# Patient Record
Sex: MALE | Race: WHITE | NOT HISPANIC OR LATINO | Employment: OTHER | URBAN - METROPOLITAN AREA
[De-identification: names, ages, dates, MRNs, and addresses within clinical notes are randomized per-mention and may not be internally consistent; named-entity substitution may affect disease eponyms.]

---

## 2017-03-15 ENCOUNTER — TELEPHONE (OUTPATIENT)
Dept: CARDIOLOGY | Facility: CLINIC | Age: 62
End: 2017-03-15

## 2017-03-15 DIAGNOSIS — E78.00 HYPERCHOLESTEROLEMIA: Primary | ICD-10-CM

## 2018-10-02 ENCOUNTER — OFFICE VISIT (OUTPATIENT)
Dept: UROLOGY | Facility: CLINIC | Age: 63
End: 2018-10-02
Payer: COMMERCIAL

## 2018-10-02 VITALS
BODY MASS INDEX: 28.75 KG/M2 | SYSTOLIC BLOOD PRESSURE: 133 MMHG | HEIGHT: 70 IN | DIASTOLIC BLOOD PRESSURE: 85 MMHG | HEART RATE: 76 BPM | WEIGHT: 200.81 LBS

## 2018-10-02 DIAGNOSIS — N52.9 ERECTILE DYSFUNCTION, UNSPECIFIED ERECTILE DYSFUNCTION TYPE: Primary | ICD-10-CM

## 2018-10-02 DIAGNOSIS — Z12.5 PROSTATE CANCER SCREENING: ICD-10-CM

## 2018-10-02 DIAGNOSIS — N13.8 BENIGN PROSTATIC HYPERPLASIA WITH URINARY OBSTRUCTION: ICD-10-CM

## 2018-10-02 DIAGNOSIS — N40.1 BENIGN PROSTATIC HYPERPLASIA WITH URINARY OBSTRUCTION: ICD-10-CM

## 2018-10-02 DIAGNOSIS — N39.0 CHRONIC UTI: ICD-10-CM

## 2018-10-02 PROCEDURE — 99214 OFFICE O/P EST MOD 30 MIN: CPT | Mod: S$GLB,,, | Performed by: UROLOGY

## 2018-10-02 PROCEDURE — 99999 PR PBB SHADOW E&M-EST. PATIENT-LVL III: CPT | Mod: PBBFAC,,, | Performed by: UROLOGY

## 2018-10-02 PROCEDURE — 3008F BODY MASS INDEX DOCD: CPT | Mod: CPTII,S$GLB,, | Performed by: UROLOGY

## 2018-10-02 NOTE — PROGRESS NOTES
Subjective:       Patient ID: Marky Patel is a 62 y.o. male.    Chief Complaint: Follow-up      Mr. Patel is a 62-year-old male with BPH with obstruction.   He was last seen 2 years ago.     Nocturia on occasion, rare. Maybe 1-2 times if it happens.     Slight worsening in erections. Occasional morning erections. Sexual desire decreased.   He tried cialis in past, but not in a while.   Decreased sexual desire.  He has 3 teas a day.  He also drinks water during the day.     He has a history of recurrent UTIs.  No history of kidney stones.  No gross hematuria.     He has nitroglycerin.    Lab Results       Component                Value               Date                       PSA                      0.73                09/12/2016                 PSA                      0.64                12/12/2012                 PSA                      0.78                04/07/2011                 PSADIAG                  0.70                09/10/2015                        Past Surgical History:   Procedure Laterality Date    CHOLECYSTECTOMY      orthoscopic knee left       VASECTOMY         Past Medical History:   Diagnosis Date    BPH (benign prostatic hyperplasia)     Sebaceous cyst     Urinary tract infection        Social History     Socioeconomic History    Marital status:      Spouse name: Not on file    Number of children: Not on file    Years of education: Not on file    Highest education level: Not on file   Social Needs    Financial resource strain: Not on file    Food insecurity - worry: Not on file    Food insecurity - inability: Not on file    Transportation needs - medical: Not on file    Transportation needs - non-medical: Not on file   Occupational History    Not on file   Tobacco Use    Smoking status: Never Smoker    Smokeless tobacco: Never Used   Substance and Sexual Activity    Alcohol use: Yes     Alcohol/week: 3.6 oz     Types: 6 Cans of beer per week    Drug use: No     Sexual activity: Not on file   Other Topics Concern    Not on file   Social History Narrative    Not on file       Family History   Problem Relation Age of Onset    Cancer Father         lymphoma    Hyperlipidemia Father     Cancer Mother         colon    Heart attack Neg Hx     Heart disease Neg Hx     Heart failure Neg Hx     Hypertension Neg Hx     Stroke Neg Hx        Current Outpatient Medications   Medication Sig Dispense Refill    acetaminophen-codeine 300-60mg (TYLENOL #4) 300-60 mg Tab Take by mouth once daily. Pt taking one to two pills once a day  3    hydrocodone-acetaminophen 7.5-325mg (NORCO) 7.5-325 mg per tablet Take 1 tablet by mouth every 6 (six) hours as needed. Pt taking PRN  0    ibuprofen (ADVIL,MOTRIN) 800 MG tablet TK 1 T PO  BID PC Pt taking prn  2    methocarbamol (ROBAXIN) 750 MG Tab TK 1 T PO  TID PRF SPASMS Pt taking prn  2    nitroGLYCERIN (NITROSTAT) 0.4 MG SL tablet Place 1 tablet (0.4 mg total) under the tongue every 5 (five) minutes as needed for Chest pain. 30 tablet 3     No current facility-administered medications for this visit.        Review of patient's allergies indicates:  No Known Allergies     BMP  Lab Results   Component Value Date     09/12/2016    K 4.8 09/12/2016     09/12/2016    CO2 28 09/12/2016    BUN 13 09/12/2016    CREATININE 1.0 09/12/2016    CALCIUM 9.6 09/12/2016    ANIONGAP 8 09/12/2016    ESTGFRAFRICA >60.0 09/12/2016    EGFRNONAA >60.0 09/12/2016       Review of Systems   Constitutional: Negative for chills, fever and unexpected weight change.   HENT: Negative for hearing loss and nosebleeds.    Eyes: Negative for visual disturbance.   Respiratory: Negative for chest tightness.    Cardiovascular: Negative for chest pain.   Gastrointestinal: Negative for diarrhea.   Genitourinary: Negative for frequency, nocturia and urgency.   Musculoskeletal: Negative for joint swelling.   Skin: Negative for rash.   Neurological: Negative  for seizures.   Hematological: Does not bruise/bleed easily.   Psychiatric/Behavioral: Negative for behavioral problems.     Objective:      Physical Exam   Constitutional: He is oriented to person, place, and time. He appears well-developed and well-nourished.   HENT:   Head: Normocephalic and atraumatic.   Eyes: No scleral icterus.   Neck: Neck supple.   Cardiovascular: Normal rate and regular rhythm.    Pulmonary/Chest: Effort normal. No respiratory distress.   Abdominal: He exhibits no mass. Hernia confirmed negative in the right inguinal area and confirmed negative in the left inguinal area.   Genitourinary: Testes normal and penis normal. Circumcised.   Genitourinary Comments: Prostate was smooth without nodularity. No rectal masses.  35 grams. External hemorrhoids were not present. Some perineal erythema.    Musculoskeletal: He exhibits no tenderness.   Lymphadenopathy:     He has no cervical adenopathy. No inguinal adenopathy noted on the right or left side.   Neurological: He is alert and oriented to person, place, and time.   Skin: Skin is warm. No rash noted.     Psychiatric: He has a normal mood and affect.       Assessment:       1. Erectile dysfunction, unspecified erectile dysfunction type    2. Chronic UTI    3. Benign prostatic hyperplasia with urinary obstruction        Plan:   Marky was seen today for follow-up.    Diagnoses and all orders for this visit:    Erectile dysfunction, unspecified erectile dysfunction type  -     Testosterone; Future    Chronic UTI    Benign prostatic hyperplasia with urinary obstruction    Prostate cancer screening  -     PSA, Screening; Future      psa and testosterone.   Discussed penile injections/JERILYN and urethral alprostadil. Patient would like to think about it.

## 2018-10-02 NOTE — PATIENT INSTRUCTIONS
Penile Self-Injection Procedure  Self-injection is a good option if you have erectile dysfunction (ED). You insert a tiny needle into your penis and inject a medicine. This helps your penis get hard and stay that way long enough for sex. And sex and orgasm will feel as good as always. You may be nervous about doing self-injection at first. But with practice, it will get easier. Your healthcare provider will show you how to do self-injection the first time.  Talk to your doctor about any medicines you take and any medical problems you have.      Preparing for injection  · Wash your hands well with soap and water.  · Prepare the medicine (if needed).  · Sit or  a comfortable position in a warm, well-lit room. If you need to, sit or  front of a mirror.  · Find an injection site on one side of your penis, in a place with no visible veins. (Dont inject into the top, bottom, or head of the penis.)  · Clean the injection site with an alcohol swab. Grasp the head of your penis firmly with your thumb and forefinger (dont just pinch the skin). Stretch the penis so the skin on the shaft is taut.  Injecting the medicine  · Rest your penis against your inner thigh and pull it gently toward your knee. Dont twist or rotate it. This way youll be sure to inject the medicine into the spot you chose and cleaned before.  · Hold the syringe between your thumb and fingers, like youre holding a pen. Rest your forearm on your thigh for support.  · Insert the needle at a 90° angle (perpendicular) to the shaft. Do this quickly to reduce discomfort. (The needle should go in easily. If it doesnt, stop right away.)  · Move your thumb to the plunger. Press down to inject the medicine, counting to 5.  · Remove the needle and dispose of it safely.  Gaining an erection  · Apply pressure to the injection site for a few minutes. This prevents swelling and bruising and helps spread the medicine.   · Stand up. This may help your  erection develop. Foreplay often helps, too.  · Your penis should become firm within 10 to 20 minutes. The erection will last long enough for sex, and maybe longer.  When to seek medical care  An erection that lasts longer than 3 to 4  hours  · Bleeding or bruising  · Severe pain  · Scarring or curvature of the penis   Date Last Reviewed: 1/7/2017  © 1422-7489 [employer]. [address] [place] [address], [place] [address]. All rights reserved. This information is not intended as a substitute for professional medical care. Always follow your healthcare professional's instructions.        Using Vacuum Erection Therapy  The accompanying steps show how to gain and maintain an erection with a vacuum erection therapy system.      Getting started  · Place the rubber tension ring on the open end of the cylinder.  · Apply water-based lubricant to the end of the cylinder.  · Put your penis into the cylinder. Hold the cylinder firmly against your abdomen to create a seal, but take care not to pinch the scrotum.  Gaining an erection  · Squeeze the hand pump or turn on the electric pump. Blood will be drawn into your penis and your penis will become erect and firm.  · Follow the instructions youve been given for using your particular brand of pump.  · It may take some practice before you get optimum erections.  Using the tension ring  · When your penis is fully erect (usually less than 5 minutes), roll the tension ring off the cylinder onto the base of your penis. The tension ring holds blood in your penis, creating an erection. The area behind the ring remains soft and flexible.  · Remove the cylinder from your penis.  · After no longer than 30 minutes, remove the tension ring from your penis by grasping the tabs on the ring and pulling to stretch the ring.  When to call your healthcare provider  · A very cold penis during erection (some coolness is normal)  · A black-and-blue or significantly darkened penis (some  discoloration is normal)  · Pain while using the vacuum device or tension ring  · Lack of an erection or loss of an erection before the tension ring is removed  Note: The tension ring may block ejaculation during orgasm. This is harmless, but will not prevent pregnancy.   Date Last Reviewed: 1/1/2017 © 2000-2017 [employer]. [address] [place] [address]. All rights reserved. This information is not intended as a substitute for professional medical care. Always follow your healthcare professional's instructions.        Alprostadil urethral suppository  What is this medicine?  ALPROSTADIL (al PROS ta dil) is a natural protein. It is used to treat erectile dysfunction (ED). This medicine helps to create and maintain an erection.  How should I use this medicine?  This medicine is for insertion into the penis. Follow the directions that come with your medicine. If you are unsure how to use this medicine, contact your doctor or health care professional. Do not change the dose of your medicine. Call your doctor or health care professional to determine if your dose needs to be changed.  A patient package insert for the product will be given with each prescription and refill. Read this sheet carefully each time. The sheet may change frequently.  This medicine is for use in men only and is not for use in children.  What side effects may I notice from receiving this medicine?  Side effects that you should report to your doctor or health care professional as soon as possible:  · allergic reactions like skin rash, itching or hives, swelling of the face, lips, or tongue)  · dizziness or light-headedness  · fainting  · fast, irregular heartbeat  · prolonged or painful erection (lasting longer than 4 hours)  · swelling of the legs  · trouble passing urine or change in the amount of urine  Side effects that usually do not require medical attention (report to your doctor or health care professional if they  continue or are bothersome):  · aching in the penis, testicles, legs, or in the perineum (area between the penis and rectum)  · minor discomfort on insertion  · redness of the penis due to increased blood flow  · small amount of bleeding or spotting from the urethra due to improper use  · warmth or burning sensation in the urethra  What may interact with this medicine?  · medicines for blood pressure  What if I miss a dose?  This medicine should only be used as needed to achieve an erection. Do not use this medicine more often than directed. Do not use more than 2 times in each 24-hour period. Each suppository is for a single use only and should be properly discarded after use.  Where should I keep my medicine?  Keep out of reach of children.  Store unopened foil pouches in a refrigerator between 2 and 8 degrees C (36 and 46 degrees F). You may store this medicine at room temperature for up to 14 days before use. Do not expose to temperatures above 30 degrees C (86 degrees F). Do not expose to direct sunlight. When traveling, store this medicine in a portable ice pack or cooler. Do not store in the trunk of a car or in baggage storage areas. Throw away any unused medicine after the expiration date.  The suppository must be thrown away after it is used. It is for single use only. Discard any used medicine and packaging carefully. This medicine may cause accidental overdose and death if it taken by other adults, children, or pets. Follow the directions you receive with each prescription refill.  What should I tell my health care provider before I take this medicine?  They need to know if you have, or have had any of these conditions:  · an abnormally formed penis  · bleeding disorders  · have a condition that might cause a painful, prolonged erection, such as sickle cell disease or trait, leukemia, or multiple myeloma  · have been advised not to engage in sexual activity  · history of blood clots  · history of  fainting  · history of low blood pressure  · penile implant  · trouble passing urine  · an unusual or allergic reaction to alprostadil or other medicines, foods, dyes, or preservatives  · if your partner is pregnant or intends to become pregnant  What should I watch for while using this medicine?  Visit your doctor or health care professional for regular checks on your condition.  If you experience dizziness or feel faint during sexual intercourse, this may be due to the lowering of your blood pressure. Lie down immediately and raise your legs. If symptoms persist, call your doctor promptly.  Contact your doctor or health care professional right away if you have an erection that lasts longer than 4 hours or if it becomes painful. This may be a sign of a serious problem and must be treated right away to prevent permanent damage.  Men should inform their doctors if they wish to father a child. This medicine may be found in human semen and may have the potential for side effects to an unborn child. A female partner should not become pregnant while you are using this medicine. It is also recommended that you do not use this medicine while your partner is pregnant. Inform your doctor if your female partner wishes to become pregnant or think they might be pregnant. Talk to your health care professional or pharmacist for more information. It is recommended that you use a condom during sexual intercourse with a female partner who is of child-bearing age. If you are not using a condom, your female partner may also experience mild vaginal itching or burning.  This medicine does not protect you or your partner against HIV infection (AIDS) or any other sexually transmitted diseases.  NOTE:This sheet is a summary. It may not cover all possible information. If you have questions about this medicine, talk to your doctor, pharmacist, or health care provider. Copyright© 2017 [employer]

## 2018-10-03 ENCOUNTER — LAB VISIT (OUTPATIENT)
Dept: LAB | Facility: HOSPITAL | Age: 63
End: 2018-10-03
Attending: UROLOGY
Payer: COMMERCIAL

## 2018-10-03 DIAGNOSIS — Z12.5 PROSTATE CANCER SCREENING: ICD-10-CM

## 2018-10-03 DIAGNOSIS — N52.9 ERECTILE DYSFUNCTION, UNSPECIFIED ERECTILE DYSFUNCTION TYPE: ICD-10-CM

## 2018-10-03 LAB
COMPLEXED PSA SERPL-MCNC: 0.61 NG/ML
TESTOST SERPL-MCNC: 419 NG/DL

## 2018-10-03 PROCEDURE — 36415 COLL VENOUS BLD VENIPUNCTURE: CPT | Mod: PO

## 2018-10-03 PROCEDURE — 84153 ASSAY OF PSA TOTAL: CPT

## 2018-10-03 PROCEDURE — 84403 ASSAY OF TOTAL TESTOSTERONE: CPT

## 2018-10-04 ENCOUNTER — PATIENT MESSAGE (OUTPATIENT)
Dept: UROLOGY | Facility: CLINIC | Age: 63
End: 2018-10-04

## 2019-11-13 ENCOUNTER — OFFICE VISIT (OUTPATIENT)
Dept: DERMATOLOGY | Facility: CLINIC | Age: 64
End: 2019-11-13
Payer: COMMERCIAL

## 2019-11-13 VITALS — BODY MASS INDEX: 28.63 KG/M2 | HEIGHT: 70 IN | WEIGHT: 200 LBS

## 2019-11-13 DIAGNOSIS — L57.0 ACTINIC KERATOSES: ICD-10-CM

## 2019-11-13 DIAGNOSIS — L71.9 ACNE ROSACEA: ICD-10-CM

## 2019-11-13 DIAGNOSIS — Z12.83 SKIN CANCER SCREENING: ICD-10-CM

## 2019-11-13 DIAGNOSIS — Z80.8 FAMILY HISTORY OF MELANOMA: Primary | ICD-10-CM

## 2019-11-13 DIAGNOSIS — L23.7 ALLERGIC CONTACT DERMATITIS DUE TO PLANTS, EXCEPT FOOD: ICD-10-CM

## 2019-11-13 PROCEDURE — 99999 PR PBB SHADOW E&M-EST. PATIENT-LVL III: CPT | Mod: PBBFAC,,, | Performed by: DERMATOLOGY

## 2019-11-13 PROCEDURE — 99999 PR PBB SHADOW E&M-EST. PATIENT-LVL III: ICD-10-PCS | Mod: PBBFAC,,, | Performed by: DERMATOLOGY

## 2019-11-13 PROCEDURE — 17000 DESTRUCT PREMALG LESION: CPT | Mod: S$GLB,,, | Performed by: DERMATOLOGY

## 2019-11-13 PROCEDURE — 17003 DESTRUCTION, PREMALIGNANT LESIONS; SECOND THROUGH 14 LESIONS: ICD-10-PCS | Mod: S$GLB,,, | Performed by: DERMATOLOGY

## 2019-11-13 PROCEDURE — 99203 OFFICE O/P NEW LOW 30 MIN: CPT | Mod: 25,S$GLB,, | Performed by: DERMATOLOGY

## 2019-11-13 PROCEDURE — 3008F BODY MASS INDEX DOCD: CPT | Mod: CPTII,S$GLB,, | Performed by: DERMATOLOGY

## 2019-11-13 PROCEDURE — 17003 DESTRUCT PREMALG LES 2-14: CPT | Mod: S$GLB,,, | Performed by: DERMATOLOGY

## 2019-11-13 PROCEDURE — 3008F PR BODY MASS INDEX (BMI) DOCUMENTED: ICD-10-PCS | Mod: CPTII,S$GLB,, | Performed by: DERMATOLOGY

## 2019-11-13 PROCEDURE — 99203 PR OFFICE/OUTPT VISIT, NEW, LEVL III, 30-44 MIN: ICD-10-PCS | Mod: 25,S$GLB,, | Performed by: DERMATOLOGY

## 2019-11-13 PROCEDURE — 17000 PR DESTRUCTION(LASER SURGERY,CRYOSURGERY,CHEMOSURGERY),PREMALIGNANT LESIONS,FIRST LESION: ICD-10-PCS | Mod: S$GLB,,, | Performed by: DERMATOLOGY

## 2019-11-13 RX ORDER — MOMETASONE FUROATE 1 MG/G
CREAM TOPICAL
Qty: 45 G | Refills: 6 | Status: SHIPPED | OUTPATIENT
Start: 2019-11-13 | End: 2022-12-02

## 2019-11-13 RX ORDER — SODIUM SULFACETAMIDE AND SULFUR 80; 40 MG/ML; MG/ML
SOLUTION TOPICAL
Qty: 472 ML | Refills: 3 | Status: SHIPPED | OUTPATIENT
Start: 2019-11-13 | End: 2022-12-02

## 2019-11-13 RX ORDER — IVERMECTIN 10 MG/G
CREAM TOPICAL
Qty: 45 G | Refills: 3 | Status: SHIPPED | OUTPATIENT
Start: 2019-11-13 | End: 2022-12-02

## 2019-11-13 NOTE — PROGRESS NOTES
Subjective:       Patient ID:  Marky Patel is a 63 y.o. male who presents for   Chief Complaint   Patient presents with    Dry Skin     New pt.   Here today c/o dryness on face, x 16 months, flakes, redness, and sometimes itchy, tx w/ otc moisturizers, no progress  Requests skin check today    No personal h/o of skin cancer  Brother recently dx with melanoma    recently doing yardwork, noticed redness, itching lower back and posterior leg. May have come in contact with poison ivy  Former      Review of Systems   Constitutional: Negative for fever, chills and fatigue.   Skin: Positive for daily sunscreen use and activity-related sunscreen use. Negative for wears hat.   Hematologic/Lymphatic: Bruises/bleeds easily.        Objective:    Physical Exam   Constitutional: He appears well-developed and well-nourished. No distress.   HENT:   Mouth/Throat: Lips normal.    Eyes: Lids are normal.  No conjunctival no injection.   Cardiovascular: There is no local extremity swelling and no dependent edema.     Neurological: He is alert and oriented to person, place, and time. He is not disoriented.   Psychiatric: He has a normal mood and affect.   Skin:   Areas Examined (abnormalities noted in diagram):   Head / Face Inspection Performed  Neck Inspection Performed  Chest / Axilla Inspection Performed  Abdomen Inspection Performed  Back Inspection Performed  RUE Inspected  LUE Inspection Performed  RLE Inspected  LLE Inspection Performed                   Diagram Legend     Erythematous scaling macule/papule c/w actinic keratosis       Vascular papule c/w angioma      Pigmented verrucoid papule/plaque c/w seborrheic keratosis      Yellow umbilicated papule c/w sebaceous hyperplasia      Irregularly shaped tan macule c/w lentigo     1-2 mm smooth white papules consistent with Milia      Movable subcutaneous cyst with punctum c/w epidermal inclusion cyst      Subcutaneous movable cyst c/w pilar cyst      Firm  pink to brown papule c/w dermatofibroma      Pedunculated fleshy papule(s) c/w skin tag(s)      Evenly pigmented macule c/w junctional nevus     Mildly variegated pigmented, slightly irregular-bordered macule c/w mildly atypical nevus      Flesh colored to evenly pigmented papule c/w intradermal nevus       Pink pearly papule/plaque c/w basal cell carcinoma      Erythematous hyperkeratotic cursted plaque c/w SCC      Surgical scar with no sign of skin cancer recurrence      Open and closed comedones      Inflammatory papules and pustules      Verrucoid papule consistent consistent with wart     Erythematous eczematous patches and plaques     Dystrophic onycholytic nail with subungual debris c/w onychomycosis     Umbilicated papule    Erythematous-base heme-crusted tan verrucoid plaque consistent with inflamed seborrheic keratosis     Erythematous Silvery Scaling Plaque c/w Psoriasis     See annotation      Assessment / Plan:        Family history of melanoma      Total body skin examination performed today including at least 12 points as noted in physical examination. No lesions suspicious for malignancy noted.      Skin cancer screening  Total body skin examination performed today including at least 12 points as noted in physical examination. No lesions suspicious for malignancy noted.  Actinic keratoses  ,Cryosurgery Procedure Note    Verbal consent from the patient is obtained and the patient is aware of the precancerous quality and need for treatment of these lesions. Liquid nitrogen cryosurgery is applied to the 3 actinic keratoses, as detailed in the physical exam, to produce a freeze injury. The patient is aware that blisters may form and is instructed on wound care with gentle cleansing and use of vaseline ointment to keep moist until healed. The patient is supplied a handout on cryosurgery and is instructed to call if lesions do not completely resolve. Discussed risk postinflammatory pigmentary changes.      Suspect AKs on face as well, recommend tx rosacea first    Acne rosacea  -     ivermectin (SOOLANTRA) 1 % Crea; AAA face qhs and wash off in morning  Dispense: 45 g; Refill: 3  -     sulfacetamide sodium-sulfur (SULFACLEANSE 8-4) 8-4 % Susp; Wash face once daily  Dispense: 472 mL; Refill: 3    Allergic contact dermatitis due to plants, except food  -     mometasone 0.1% (ELOCON) 0.1 % cream; aaa poison ivy and other contact dermatitis bid only prn  Dispense: 45 g; Refill: 6             Follow up in about 2 months (around 1/13/2020).

## 2020-08-12 ENCOUNTER — OFFICE VISIT (OUTPATIENT)
Dept: DERMATOLOGY | Facility: CLINIC | Age: 65
End: 2020-08-12
Payer: COMMERCIAL

## 2020-08-12 VITALS — HEIGHT: 70 IN | BODY MASS INDEX: 28.63 KG/M2 | WEIGHT: 200 LBS

## 2020-08-12 DIAGNOSIS — D48.5 NEOPLASM OF UNCERTAIN BEHAVIOR OF SKIN: Primary | ICD-10-CM

## 2020-08-12 DIAGNOSIS — B35.4 TINEA CORPORIS: ICD-10-CM

## 2020-08-12 PROCEDURE — 88305 TISSUE EXAM BY PATHOLOGIST: ICD-10-PCS | Mod: 26,,, | Performed by: DERMATOLOGY

## 2020-08-12 PROCEDURE — 3008F PR BODY MASS INDEX (BMI) DOCUMENTED: ICD-10-PCS | Mod: CPTII,S$GLB,, | Performed by: DERMATOLOGY

## 2020-08-12 PROCEDURE — 99999 PR PBB SHADOW E&M-EST. PATIENT-LVL III: ICD-10-PCS | Mod: PBBFAC,,, | Performed by: DERMATOLOGY

## 2020-08-12 PROCEDURE — 99213 OFFICE O/P EST LOW 20 MIN: CPT | Mod: 25,S$GLB,, | Performed by: DERMATOLOGY

## 2020-08-12 PROCEDURE — 99999 PR PBB SHADOW E&M-EST. PATIENT-LVL III: CPT | Mod: PBBFAC,,, | Performed by: DERMATOLOGY

## 2020-08-12 PROCEDURE — 11102 TANGNTL BX SKIN SINGLE LES: CPT | Mod: S$GLB,,, | Performed by: DERMATOLOGY

## 2020-08-12 PROCEDURE — 11103 PR TANGENTIAL BIOPSY, SKIN, EA ADDTL LESION: ICD-10-PCS | Mod: S$GLB,,, | Performed by: DERMATOLOGY

## 2020-08-12 PROCEDURE — 11103 TANGNTL BX SKIN EA SEP/ADDL: CPT | Mod: S$GLB,,, | Performed by: DERMATOLOGY

## 2020-08-12 PROCEDURE — 88305 TISSUE EXAM BY PATHOLOGIST: CPT | Mod: 26,,, | Performed by: DERMATOLOGY

## 2020-08-12 PROCEDURE — 99213 PR OFFICE/OUTPT VISIT, EST, LEVL III, 20-29 MIN: ICD-10-PCS | Mod: 25,S$GLB,, | Performed by: DERMATOLOGY

## 2020-08-12 PROCEDURE — 3008F BODY MASS INDEX DOCD: CPT | Mod: CPTII,S$GLB,, | Performed by: DERMATOLOGY

## 2020-08-12 PROCEDURE — 88305 TISSUE EXAM BY PATHOLOGIST: CPT | Mod: 59 | Performed by: DERMATOLOGY

## 2020-08-12 PROCEDURE — 11102 PR TANGENTIAL BIOPSY, SKIN, SINGLE LESION: ICD-10-PCS | Mod: S$GLB,,, | Performed by: DERMATOLOGY

## 2020-08-12 NOTE — PROGRESS NOTES
Subjective:       Patient ID:  Marky Patel is a 64 y.o. male who presents for   Chief Complaint   Patient presents with    Spot     LOV 11/2019 for AK and acne rosacea managing with soolantra sul wash    Pt here today for c/o spots, on right arm and left lower leg, x few months-yr, stays scaly and not heal, no tx    family H/o melanoma-brother        rash, buttocks, itchy. Scratches, worse at night  Denies history tinea    Review of Systems   Constitutional: Negative for fever, chills and fatigue.   Skin: Positive for daily sunscreen use and activity-related sunscreen use.        Objective:    Physical Exam   Constitutional: He appears well-developed and well-nourished. No distress.   HENT:   Mouth/Throat: Lips normal.    Eyes: Lids are normal.  No conjunctival no injection.   Cardiovascular: There is no local extremity swelling and no dependent edema.     Neurological: He is alert and oriented to person, place, and time. He is not disoriented.   Psychiatric: He has a normal mood and affect.   Skin:   Areas Examined (abnormalities noted in diagram):   Head / Face Inspection Performed  Neck Inspection Performed  Genitals / Buttocks / Groin Inspection Performed  RUE Inspected  LUE Inspection Performed  RLE Inspected  LLE Inspection Performed                   Diagram Legend     Erythematous scaling macule/papule c/w actinic keratosis       Vascular papule c/w angioma      Pigmented verrucoid papule/plaque c/w seborrheic keratosis      Yellow umbilicated papule c/w sebaceous hyperplasia      Irregularly shaped tan macule c/w lentigo     1-2 mm smooth white papules consistent with Milia      Movable subcutaneous cyst with punctum c/w epidermal inclusion cyst      Subcutaneous movable cyst c/w pilar cyst      Firm pink to brown papule c/w dermatofibroma      Pedunculated fleshy papule(s) c/w skin tag(s)      Evenly pigmented macule c/w junctional nevus     Mildly variegated pigmented, slightly  irregular-bordered macule c/w mildly atypical nevus      Flesh colored to evenly pigmented papule c/w intradermal nevus       Pink pearly papule/plaque c/w basal cell carcinoma      Erythematous hyperkeratotic cursted plaque c/w SCC      Surgical scar with no sign of skin cancer recurrence      Open and closed comedones      Inflammatory papules and pustules      Verrucoid papule consistent consistent with wart     Erythematous eczematous patches and plaques     Dystrophic onycholytic nail with subungual debris c/w onychomycosis     Umbilicated papule    Erythematous-base heme-crusted tan verrucoid plaque consistent with inflamed seborrheic keratosis     Erythematous Silvery Scaling Plaque c/w Psoriasis     See annotation              Assessment / Plan:      Pathology Orders:     Normal Orders This Visit    Specimen to Pathology, Dermatology     Questions:    Procedure Type: Dermatology and skin neoplasms    Number of Specimens: 2    ------------------------: -------------------------    Spec 1 Procedure: Biopsy    Spec 1 Clinical Impression: scc vs other check margins    Spec 1 Source: left leg    ------------------------: -------------------------    Spec 2 Procedure: Biopsy    Spec 2 Clinical Impression: scc vs ak vs other    Spec 2 Source: right arm    Clinical Information: 1. pink nodule 2. scaling pink plaque        Neoplasm of uncertain behavior of skin  -     Specimen to Pathology, Dermatology  Shave biopsy procedure note:    Shave biopsy performed after verbal consent including risk of infection, scar, recurrence, need for additional treatment of site. Area prepped with alcohol, anesthetized with approximately 1.0cc of 1% lidocaine with epinephrine. Lesional tissue shaved with razor blade. Hemostasis achieved with application of aluminum chloride followed by hyfrecation. No complications. Dressing applied. Wound care explained.        Tinea corporis    otc lamisil cream bid   Call if not improving          Follow up in about 6 months (around 2/12/2021).

## 2020-08-18 LAB
FINAL PATHOLOGIC DIAGNOSIS: NORMAL
GROSS: NORMAL
MICROSCOPIC EXAM: NORMAL

## 2020-11-27 ENCOUNTER — OFFICE VISIT (OUTPATIENT)
Dept: UROLOGY | Facility: CLINIC | Age: 65
End: 2020-11-27
Payer: COMMERCIAL

## 2020-11-27 VITALS — WEIGHT: 213.94 LBS | BODY MASS INDEX: 30.63 KG/M2 | HEIGHT: 70 IN

## 2020-11-27 DIAGNOSIS — N40.0 ENLARGED PROSTATE: ICD-10-CM

## 2020-11-27 DIAGNOSIS — N45.1 EPIDIDYMITIS: ICD-10-CM

## 2020-11-27 DIAGNOSIS — N50.82 SCROTAL PAIN: Primary | ICD-10-CM

## 2020-11-27 DIAGNOSIS — N41.0 ACUTE PROSTATITIS: ICD-10-CM

## 2020-11-27 PROCEDURE — 1125F PR PAIN SEVERITY QUANTIFIED, PAIN PRESENT: ICD-10-PCS | Mod: S$GLB,,, | Performed by: STUDENT IN AN ORGANIZED HEALTH CARE EDUCATION/TRAINING PROGRAM

## 2020-11-27 PROCEDURE — 99214 OFFICE O/P EST MOD 30 MIN: CPT | Mod: S$GLB,,, | Performed by: STUDENT IN AN ORGANIZED HEALTH CARE EDUCATION/TRAINING PROGRAM

## 2020-11-27 PROCEDURE — 87086 URINE CULTURE/COLONY COUNT: CPT

## 2020-11-27 PROCEDURE — 99999 PR PBB SHADOW E&M-EST. PATIENT-LVL III: ICD-10-PCS | Mod: PBBFAC,,, | Performed by: STUDENT IN AN ORGANIZED HEALTH CARE EDUCATION/TRAINING PROGRAM

## 2020-11-27 PROCEDURE — 3008F PR BODY MASS INDEX (BMI) DOCUMENTED: ICD-10-PCS | Mod: CPTII,S$GLB,, | Performed by: STUDENT IN AN ORGANIZED HEALTH CARE EDUCATION/TRAINING PROGRAM

## 2020-11-27 PROCEDURE — 1125F AMNT PAIN NOTED PAIN PRSNT: CPT | Mod: S$GLB,,, | Performed by: STUDENT IN AN ORGANIZED HEALTH CARE EDUCATION/TRAINING PROGRAM

## 2020-11-27 PROCEDURE — 99214 PR OFFICE/OUTPT VISIT, EST, LEVL IV, 30-39 MIN: ICD-10-PCS | Mod: S$GLB,,, | Performed by: STUDENT IN AN ORGANIZED HEALTH CARE EDUCATION/TRAINING PROGRAM

## 2020-11-27 PROCEDURE — 3008F BODY MASS INDEX DOCD: CPT | Mod: CPTII,S$GLB,, | Performed by: STUDENT IN AN ORGANIZED HEALTH CARE EDUCATION/TRAINING PROGRAM

## 2020-11-27 PROCEDURE — 99999 PR PBB SHADOW E&M-EST. PATIENT-LVL III: CPT | Mod: PBBFAC,,, | Performed by: STUDENT IN AN ORGANIZED HEALTH CARE EDUCATION/TRAINING PROGRAM

## 2020-11-27 RX ORDER — DOXYCYCLINE HYCLATE 100 MG
100 TABLET ORAL EVERY 12 HOURS
Qty: 28 TABLET | Refills: 0 | Status: SHIPPED | OUTPATIENT
Start: 2020-11-27 | End: 2020-12-11

## 2020-11-27 NOTE — PROGRESS NOTES
Patient ID: Marky Patel is a 64 y.o. male.    Chief Complaint: Urinary Tract Infection (new pt coming in for possible UTI, pt has been experiencing abd pain ) and Prostate Check (pt would like prostate check )    Referral: Self    HPI   Last evaluated in urologist office 10/2/18, Dr. Ramos for BPH, ED. Previously had hx of rec UTI.     Patient w/ 3 week hx of scrotal pain associated with L sided swelling. Patient reports slight pelvic discomfort while driving. Patient denies dysuria, gross hematuria. Patient denies flank pain. He is not aware of aggravating or alleviating factors.  Patient sexually active without assistance. Patient reports nocturia 3/4 times per evening, he has noted increased consumption of beer at night. Patient has hx of LORI does not use CPAP as previously prescribed.  Patient reports that its more likely he is awake then urinates rather than he is awakened to void. Patient does not drink caffeine, soda. He has been drinking unsweetened cranberry juice to help with his symptoms. He drinks about 3 bottles of water daily. He has not had a UTI in about 3 years.     Patient reports dry ejaculations, he is not taking flomax, he was previously prescribed but did not perceive any benefit.     Hx of vasectomy    Last PSA obtained 10/3/18- 0.61        ROS  Review of Systems   Constitutional: Negative for activity change, appetite change, chills, diaphoresis, fatigue and fever.   HENT: Negative for congestion, rhinorrhea and sore throat.    Eyes: Negative for discharge and visual disturbance.   Respiratory: Negative for cough, chest tightness, shortness of breath and wheezing.    Cardiovascular: Negative for chest pain and leg swelling.   Gastrointestinal: Negative for abdominal distention, abdominal pain, blood in stool, constipation, diarrhea, nausea and vomiting.   Genitourinary: Positive for frequency and scrotal swelling. Negative for discharge, dysuria, hematuria, penile pain and penile  swelling.   Musculoskeletal: Negative for back pain and gait problem.   Skin: Negative for color change, rash and wound.   Allergic/Immunologic: Negative for immunocompromised state.   Neurological: Negative for light-headedness and headaches.   Psychiatric/Behavioral: Negative for confusion. The patient is not nervous/anxious.          Past Medical History  Active Ambulatory Problems     Diagnosis Date Noted    Benign prostatic hyperplasia with urinary obstruction 12/11/2012    Hypercholesterolemia 09/08/2015    Erectile dysfunction 09/08/2015    Chronic UTI 08/25/2016    Chest pain on exertion 08/31/2016    Shortness of breath 08/31/2016    LORI (obstructive sleep apnea) 08/31/2016     Resolved Ambulatory Problems     Diagnosis Date Noted    No Resolved Ambulatory Problems     Past Medical History:   Diagnosis Date    BPH (benign prostatic hyperplasia)     Sebaceous cyst     Urinary tract infection          Past Surgical History  Past Surgical History:   Procedure Laterality Date    CHOLECYSTECTOMY      orthoscopic knee left       VASECTOMY         Social History  Relationships   Social connections    Talks on phone: Not on file    Gets together: Not on file    Attends [employer] service: Not on file    Active member of club or organization: Not on file    Attends meetings of clubs or organizations: Not on file    Relationship status: Not on file       Medications    Current Outpatient Medications:     ivermectin (SOOLANTRA) 1 % Crea, AAA face qhs and wash off in morning, Disp: 45 g, Rfl: 3    mometasone 0.1% (ELOCON) 0.1 % cream, aaa poison ivy and other contact dermatitis bid only prn (Patient not taking: Reported on 8/12/2020), Disp: 45 g, Rfl: 6    sulfacetamide sodium-sulfur (SULFACLEANSE 8-4) 8-4 % Susp, Wash face once daily, Disp: 472 mL, Rfl: 3    Allergies  Review of patient's allergies indicates:  No Known Allergies    Patient's PMH, FH, Social hx, Medications, allergies reviewed and  updated as pertinent to today's visit    Objective:      Physical Exam  Vitals signs reviewed.   Constitutional:       General: He is not in acute distress.     Appearance: He is well-developed. He is not ill-appearing, toxic-appearing or diaphoretic.   HENT:      Head: Normocephalic and atraumatic.      Nose: No congestion or rhinorrhea.      Mouth/Throat:      Mouth: Mucous membranes are moist.   Eyes:      Conjunctiva/sclera: Conjunctivae normal.   Neck:      Musculoskeletal: Normal range of motion and neck supple.   Cardiovascular:      Rate and Rhythm: Normal rate.   Pulmonary:      Effort: Pulmonary effort is normal. No respiratory distress.   Abdominal:      General: Abdomen is flat. There is no distension.      Palpations: Abdomen is soft. There is no mass.      Tenderness: There is no abdominal tenderness. There is no guarding.   Genitourinary:     Penis: Circumcised.       Scrotum/Testes:         Right: Mass, tenderness or swelling not present.         Left: Tenderness and swelling present. Mass not present.      Prostate: Tender. Not enlarged.      Rectum: No mass, tenderness or external hemorrhoid.   Musculoskeletal:         General: No swelling or deformity.   Skin:     General: Skin is warm and dry.      Capillary Refill: Capillary refill takes less than 2 seconds.      Findings: No rash.   Neurological:      Mental Status: He is alert and oriented to person, place, and time.      Gait: Gait normal.   Psychiatric:         Mood and Affect: Mood normal.         Thought Content: Thought content normal.         Judgment: Judgment normal.           PSA trend   PSA                      0.73          09/12/2016      PSA                      0.64          12/12/2012         PSA                      0.78          04/07/2011      PSADIAG              0.70         09/10/2015             Assessment:       1. Scrotal pain    2. Epididymitis    3. Enlarged prostate    4. Acute prostatitis        Plan:            Scrotal US to eval for epididymitis  Urine culture  UA  10 d of antibiotics for prostatitis/epididymitis   Discussed increased beer and LORI can contribute to increased urinary frequency at night. Minimizing beer intake and complying with CPAP may be helpful in reducing his symptoms.     PSA in two months  RTC 2 months

## 2020-11-29 LAB — BACTERIA UR CULT: NO GROWTH

## 2020-12-07 ENCOUNTER — HOSPITAL ENCOUNTER (OUTPATIENT)
Dept: RADIOLOGY | Facility: HOSPITAL | Age: 65
Discharge: HOME | End: 2020-12-07
Attending: STUDENT IN AN ORGANIZED HEALTH CARE EDUCATION/TRAINING PROGRAM
Payer: COMMERCIAL

## 2020-12-07 DIAGNOSIS — N50.82 SCROTAL PAIN: ICD-10-CM

## 2020-12-07 PROCEDURE — 76870 US EXAM SCROTUM: CPT | Mod: 26,,, | Performed by: RADIOLOGY

## 2020-12-07 PROCEDURE — 76870 US EXAM SCROTUM: CPT | Mod: TC

## 2020-12-07 PROCEDURE — 76870 US SCROTUM AND TESTICLES: ICD-10-PCS | Mod: 26,,, | Performed by: RADIOLOGY

## 2020-12-29 ENCOUNTER — OFFICE VISIT (OUTPATIENT)
Dept: FAMILY MEDICINE | Facility: CLINIC | Age: 65
End: 2020-12-29
Payer: COMMERCIAL

## 2020-12-29 VITALS
SYSTOLIC BLOOD PRESSURE: 124 MMHG | HEIGHT: 70 IN | OXYGEN SATURATION: 98 % | BODY MASS INDEX: 30.99 KG/M2 | WEIGHT: 216.5 LBS | TEMPERATURE: 98 F | HEART RATE: 72 BPM | DIASTOLIC BLOOD PRESSURE: 76 MMHG

## 2020-12-29 DIAGNOSIS — R53.83 FATIGUE, UNSPECIFIED TYPE: ICD-10-CM

## 2020-12-29 DIAGNOSIS — Z00.00 HEALTHCARE MAINTENANCE: ICD-10-CM

## 2020-12-29 DIAGNOSIS — R10.32 LEFT LOWER QUADRANT ABDOMINAL PAIN: ICD-10-CM

## 2020-12-29 DIAGNOSIS — R10.32 LEFT INGUINAL PAIN: ICD-10-CM

## 2020-12-29 DIAGNOSIS — R59.0 LAD (LYMPHADENOPATHY) OF RIGHT CERVICAL REGION: Primary | ICD-10-CM

## 2020-12-29 PROCEDURE — 1126F AMNT PAIN NOTED NONE PRSNT: CPT | Mod: S$GLB,,, | Performed by: PHYSICIAN ASSISTANT

## 2020-12-29 PROCEDURE — 99999 PR PBB SHADOW E&M-EST. PATIENT-LVL IV: CPT | Mod: PBBFAC,,, | Performed by: PHYSICIAN ASSISTANT

## 2020-12-29 PROCEDURE — 99204 PR OFFICE/OUTPT VISIT, NEW, LEVL IV, 45-59 MIN: ICD-10-PCS | Mod: S$GLB,,, | Performed by: PHYSICIAN ASSISTANT

## 2020-12-29 PROCEDURE — 3008F PR BODY MASS INDEX (BMI) DOCUMENTED: ICD-10-PCS | Mod: CPTII,S$GLB,, | Performed by: PHYSICIAN ASSISTANT

## 2020-12-29 PROCEDURE — 99999 PR PBB SHADOW E&M-EST. PATIENT-LVL IV: ICD-10-PCS | Mod: PBBFAC,,, | Performed by: PHYSICIAN ASSISTANT

## 2020-12-29 PROCEDURE — 1126F PR PAIN SEVERITY QUANTIFIED, NO PAIN PRESENT: ICD-10-PCS | Mod: S$GLB,,, | Performed by: PHYSICIAN ASSISTANT

## 2020-12-29 PROCEDURE — 99204 OFFICE O/P NEW MOD 45 MIN: CPT | Mod: S$GLB,,, | Performed by: PHYSICIAN ASSISTANT

## 2020-12-29 PROCEDURE — 3008F BODY MASS INDEX DOCD: CPT | Mod: CPTII,S$GLB,, | Performed by: PHYSICIAN ASSISTANT

## 2020-12-31 ENCOUNTER — LAB VISIT (OUTPATIENT)
Dept: LAB | Facility: HOSPITAL | Age: 65
End: 2020-12-31
Attending: PHYSICIAN ASSISTANT
Payer: COMMERCIAL

## 2020-12-31 ENCOUNTER — TELEPHONE (OUTPATIENT)
Dept: FAMILY MEDICINE | Facility: CLINIC | Age: 65
End: 2020-12-31

## 2020-12-31 DIAGNOSIS — Z00.00 HEALTHCARE MAINTENANCE: ICD-10-CM

## 2020-12-31 DIAGNOSIS — R59.0 LAD (LYMPHADENOPATHY) OF RIGHT CERVICAL REGION: ICD-10-CM

## 2020-12-31 DIAGNOSIS — R53.83 FATIGUE, UNSPECIFIED TYPE: ICD-10-CM

## 2020-12-31 LAB
ALBUMIN SERPL BCP-MCNC: 4.1 G/DL (ref 3.5–5.2)
ALP SERPL-CCNC: 76 U/L (ref 55–135)
ALT SERPL W/O P-5'-P-CCNC: 30 U/L (ref 10–44)
ANION GAP SERPL CALC-SCNC: 8 MMOL/L (ref 8–16)
AST SERPL-CCNC: 26 U/L (ref 10–40)
BASOPHILS # BLD AUTO: 0.03 K/UL (ref 0–0.2)
BASOPHILS NFR BLD: 0.6 % (ref 0–1.9)
BILIRUB SERPL-MCNC: 0.7 MG/DL (ref 0.1–1)
BUN SERPL-MCNC: 16 MG/DL (ref 8–23)
CALCIUM SERPL-MCNC: 9.1 MG/DL (ref 8.7–10.5)
CHLORIDE SERPL-SCNC: 105 MMOL/L (ref 95–110)
CHOLEST SERPL-MCNC: 255 MG/DL (ref 120–199)
CHOLEST/HDLC SERPL: 5.4 {RATIO} (ref 2–5)
CO2 SERPL-SCNC: 26 MMOL/L (ref 23–29)
COMPLEXED PSA SERPL-MCNC: 0.63 NG/ML (ref 0–4)
CREAT SERPL-MCNC: 1 MG/DL (ref 0.5–1.4)
DIFFERENTIAL METHOD: ABNORMAL
EOSINOPHIL # BLD AUTO: 0.1 K/UL (ref 0–0.5)
EOSINOPHIL NFR BLD: 1.7 % (ref 0–8)
ERYTHROCYTE [DISTWIDTH] IN BLOOD BY AUTOMATED COUNT: 12.3 % (ref 11.5–14.5)
EST. GFR  (AFRICAN AMERICAN): >60 ML/MIN/1.73 M^2
EST. GFR  (NON AFRICAN AMERICAN): >60 ML/MIN/1.73 M^2
GLUCOSE SERPL-MCNC: 114 MG/DL (ref 70–110)
HCT VFR BLD AUTO: 43.7 % (ref 40–54)
HCV AB SERPL QL IA: NEGATIVE
HDLC SERPL-MCNC: 47 MG/DL (ref 40–75)
HDLC SERPL: 18.4 % (ref 20–50)
HGB BLD-MCNC: 14.9 G/DL (ref 14–18)
HIV 1+2 AB+HIV1 P24 AG SERPL QL IA: NEGATIVE
IMM GRANULOCYTES # BLD AUTO: 0.02 K/UL (ref 0–0.04)
IMM GRANULOCYTES NFR BLD AUTO: 0.4 % (ref 0–0.5)
LDLC SERPL CALC-MCNC: 170.6 MG/DL (ref 63–159)
LYMPHOCYTES # BLD AUTO: 1.7 K/UL (ref 1–4.8)
LYMPHOCYTES NFR BLD: 33.7 % (ref 18–48)
MCH RBC QN AUTO: 31.2 PG (ref 27–31)
MCHC RBC AUTO-ENTMCNC: 34.1 G/DL (ref 32–36)
MCV RBC AUTO: 92 FL (ref 82–98)
MONOCYTES # BLD AUTO: 0.5 K/UL (ref 0.3–1)
MONOCYTES NFR BLD: 10.4 % (ref 4–15)
NEUTROPHILS # BLD AUTO: 2.8 K/UL (ref 1.8–7.7)
NEUTROPHILS NFR BLD: 53.2 % (ref 38–73)
NONHDLC SERPL-MCNC: 208 MG/DL
NRBC BLD-RTO: 0 /100 WBC
PLATELET # BLD AUTO: 203 K/UL (ref 150–350)
PMV BLD AUTO: 9.9 FL (ref 9.2–12.9)
POTASSIUM SERPL-SCNC: 4.6 MMOL/L (ref 3.5–5.1)
PROT SERPL-MCNC: 7 G/DL (ref 6–8.4)
RBC # BLD AUTO: 4.77 M/UL (ref 4.6–6.2)
SODIUM SERPL-SCNC: 139 MMOL/L (ref 136–145)
T4 FREE SERPL-MCNC: 0.97 NG/DL (ref 0.71–1.51)
TESTOST SERPL-MCNC: 465 NG/DL (ref 304–1227)
TRIGL SERPL-MCNC: 187 MG/DL (ref 30–150)
TSH SERPL DL<=0.005 MIU/L-ACNC: 1.52 UIU/ML (ref 0.4–4)
WBC # BLD AUTO: 5.17 K/UL (ref 3.9–12.7)

## 2020-12-31 PROCEDURE — 86703 HIV-1/HIV-2 1 RESULT ANTBDY: CPT

## 2020-12-31 PROCEDURE — 84153 ASSAY OF PSA TOTAL: CPT

## 2020-12-31 PROCEDURE — 80061 LIPID PANEL: CPT

## 2020-12-31 PROCEDURE — 84443 ASSAY THYROID STIM HORMONE: CPT

## 2020-12-31 PROCEDURE — 85025 COMPLETE CBC W/AUTO DIFF WBC: CPT

## 2020-12-31 PROCEDURE — 86803 HEPATITIS C AB TEST: CPT

## 2020-12-31 PROCEDURE — 36415 COLL VENOUS BLD VENIPUNCTURE: CPT | Mod: PO

## 2020-12-31 PROCEDURE — 84439 ASSAY OF FREE THYROXINE: CPT

## 2020-12-31 PROCEDURE — 84403 ASSAY OF TOTAL TESTOSTERONE: CPT

## 2020-12-31 PROCEDURE — 80053 COMPREHEN METABOLIC PANEL: CPT

## 2021-01-04 ENCOUNTER — HOSPITAL ENCOUNTER (OUTPATIENT)
Dept: RADIOLOGY | Facility: HOSPITAL | Age: 66
Discharge: HOME | End: 2021-01-04
Attending: PHYSICIAN ASSISTANT
Payer: COMMERCIAL

## 2021-01-04 DIAGNOSIS — R59.0 LAD (LYMPHADENOPATHY) OF RIGHT CERVICAL REGION: ICD-10-CM

## 2021-01-04 DIAGNOSIS — R10.32 LEFT INGUINAL PAIN: ICD-10-CM

## 2021-01-04 DIAGNOSIS — R10.32 LEFT LOWER QUADRANT ABDOMINAL PAIN: ICD-10-CM

## 2021-01-04 PROCEDURE — 25500020 PHARM REV CODE 255

## 2021-01-04 PROCEDURE — 74176 CT ABD & PELVIS W/O CONTRAST: CPT | Mod: 26,,, | Performed by: RADIOLOGY

## 2021-01-04 PROCEDURE — 76536 US EXAM OF HEAD AND NECK: CPT | Mod: TC

## 2021-01-04 PROCEDURE — 74176 CT ABDOMEN PELVIS WITHOUT CONTRAST: ICD-10-PCS | Mod: 26,,, | Performed by: RADIOLOGY

## 2021-01-04 PROCEDURE — 76536 US SOFT TISSUE HEAD NECK THYROID: ICD-10-PCS | Mod: 26,,, | Performed by: RADIOLOGY

## 2021-01-04 PROCEDURE — 74176 CT ABD & PELVIS W/O CONTRAST: CPT | Mod: TC

## 2021-01-04 PROCEDURE — A9698 NON-RAD CONTRAST MATERIALNOC: HCPCS

## 2021-01-04 PROCEDURE — 76536 US EXAM OF HEAD AND NECK: CPT | Mod: 26,,, | Performed by: RADIOLOGY

## 2021-01-04 RX ADMIN — IOHEXOL 1000 ML: 12 SOLUTION ORAL at 10:01

## 2021-04-29 ENCOUNTER — PATIENT MESSAGE (OUTPATIENT)
Dept: RESEARCH | Facility: HOSPITAL | Age: 66
End: 2021-04-29

## 2022-07-29 ENCOUNTER — PATIENT MESSAGE (OUTPATIENT)
Dept: UROLOGY | Facility: CLINIC | Age: 67
End: 2022-07-29
Payer: COMMERCIAL

## 2022-08-08 ENCOUNTER — TELEPHONE (OUTPATIENT)
Dept: UROLOGY | Facility: CLINIC | Age: 67
End: 2022-08-08
Payer: COMMERCIAL

## 2022-08-08 NOTE — TELEPHONE ENCOUNTER
I spoke with patient who decided to see Letha Posadas NP instead of waiting to see  due to book out.

## 2022-08-15 ENCOUNTER — OFFICE VISIT (OUTPATIENT)
Dept: UROLOGY | Facility: CLINIC | Age: 67
End: 2022-08-15
Payer: MEDICARE

## 2022-08-15 ENCOUNTER — LAB VISIT (OUTPATIENT)
Dept: LAB | Facility: HOSPITAL | Age: 67
End: 2022-08-15
Payer: MEDICARE

## 2022-08-15 VITALS
BODY MASS INDEX: 29.89 KG/M2 | DIASTOLIC BLOOD PRESSURE: 81 MMHG | SYSTOLIC BLOOD PRESSURE: 135 MMHG | HEIGHT: 70 IN | WEIGHT: 208.75 LBS | HEART RATE: 68 BPM

## 2022-08-15 DIAGNOSIS — N40.0 ENLARGED PROSTATE: Primary | ICD-10-CM

## 2022-08-15 DIAGNOSIS — R33.9 INCOMPLETE BLADDER EMPTYING: ICD-10-CM

## 2022-08-15 DIAGNOSIS — N40.0 ENLARGED PROSTATE: ICD-10-CM

## 2022-08-15 LAB
BILIRUB SERPL-MCNC: NORMAL MG/DL
BLOOD URINE, POC: NORMAL
CLARITY, POC UA: CLEAR
COLOR, POC UA: YELLOW
COMPLEXED PSA SERPL-MCNC: 0.82 NG/ML (ref 0–4)
GLUCOSE UR QL STRIP: NORMAL
KETONES UR QL STRIP: NORMAL
LEUKOCYTE ESTERASE URINE, POC: NORMAL
NITRITE, POC UA: NORMAL
PH, POC UA: 8
POC RESIDUAL URINE VOLUME: 137 ML (ref 0–100)
PROTEIN, POC: NORMAL
SPECIFIC GRAVITY, POC UA: 1
UROBILINOGEN, POC UA: NORMAL

## 2022-08-15 PROCEDURE — 36415 COLL VENOUS BLD VENIPUNCTURE: CPT | Performed by: NURSE PRACTITIONER

## 2022-08-15 PROCEDURE — 81002 URINALYSIS NONAUTO W/O SCOPE: CPT | Mod: PBBFAC | Performed by: NURSE PRACTITIONER

## 2022-08-15 PROCEDURE — 51798 US URINE CAPACITY MEASURE: CPT | Mod: PBBFAC | Performed by: NURSE PRACTITIONER

## 2022-08-15 PROCEDURE — 84153 ASSAY OF PSA TOTAL: CPT | Performed by: NURSE PRACTITIONER

## 2022-08-15 PROCEDURE — 99999 PR PBB SHADOW E&M-EST. PATIENT-LVL III: CPT | Mod: PBBFAC,,, | Performed by: NURSE PRACTITIONER

## 2022-08-15 PROCEDURE — 99213 OFFICE O/P EST LOW 20 MIN: CPT | Mod: PBBFAC | Performed by: NURSE PRACTITIONER

## 2022-08-15 PROCEDURE — 99214 OFFICE O/P EST MOD 30 MIN: CPT | Mod: S$PBB,,, | Performed by: NURSE PRACTITIONER

## 2022-08-15 PROCEDURE — 99214 PR OFFICE/OUTPT VISIT, EST, LEVL IV, 30-39 MIN: ICD-10-PCS | Mod: S$PBB,,, | Performed by: NURSE PRACTITIONER

## 2022-08-15 PROCEDURE — 99999 PR PBB SHADOW E&M-EST. PATIENT-LVL III: ICD-10-PCS | Mod: PBBFAC,,, | Performed by: NURSE PRACTITIONER

## 2022-08-15 NOTE — PROGRESS NOTES
CHIEF COMPLAINT:    Mr. Patel is a 66 y.o. male presenting for   Chief Complaint   Patient presents with    Prostate Check       PRESENTING ILLNESS:    Marky Patel is a 66 y.o. male with a PMH of hypercholesterolemia, bph, ED, LORI who presents for annual visit.    He reports a good urinary stream and does not completely empty bladder.  Reports having urodynamics several years ago that confirmed not emptying bladder.  Nocturia on occasion but not all the time.     No family history of prostate cancer. Last PSA reviewed 0.63 12/31/2020.     Has history of erectile dysfunction.  Not bothersome to him at this time.      REVIEW OF SYSTEMS:    Review of Systems   Constitutional: Negative for chills and fever.   Respiratory: Negative for shortness of breath.    Cardiovascular: Negative for chest pain.   Gastrointestinal: Negative for constipation and diarrhea.   Genitourinary: Negative for dysuria, flank pain, frequency, hematuria and urgency.   Neurological: Negative for dizziness and weakness.       PATIENT HISTORY:    Past Medical History:   Diagnosis Date    BPH (benign prostatic hyperplasia)     Sebaceous cyst     Urinary tract infection        Family History   Problem Relation Age of Onset    Cancer Father         lymphoma diagnosed at 81 years    Hyperlipidemia Father     Cancer Mother         colon 49 years old    Melanoma Brother     Colon cancer Sister     Heart attack Neg Hx     Heart disease Neg Hx     Heart failure Neg Hx     Hypertension Neg Hx     Stroke Neg Hx     Psoriasis Neg Hx     Lupus Neg Hx     Eczema Neg Hx        Allergies:  Patient has no known allergies.    Medications:    Current Outpatient Medications:     ivermectin (SOOLANTRA) 1 % Crea, AAA face qhs and wash off in morning, Disp: 45 g, Rfl: 3    mometasone 0.1% (ELOCON) 0.1 % cream, aaa poison ivy and other contact dermatitis bid only prn (Patient not taking: Reported on 8/12/2020), Disp: 45 g, Rfl: 6     sulfacetamide sodium-sulfur (SULFACLEANSE 8-4) 8-4 % Susp, Wash face once daily, Disp: 472 mL, Rfl: 3    PHYSICAL EXAMINATION:      Physical Exam  Vitals and nursing note reviewed.   Constitutional:       Appearance: Normal appearance. He is well-developed.   HENT:      Head: Normocephalic and atraumatic.   Eyes:      Pupils: Pupils are equal, round, and reactive to light.   Pulmonary:      Effort: Pulmonary effort is normal.   Abdominal:      Hernia: There is no hernia in the right inguinal area or left inguinal area.   Genitourinary:     Penis: Normal.       Testes: Normal.      Prostate: Enlarged. Not tender.      Rectum: Normal.      Comments: No nodules felt, prostate smooth  Musculoskeletal:         General: Normal range of motion.      Cervical back: Normal range of motion.   Skin:     General: Skin is warm and dry.   Neurological:      Mental Status: He is alert and oriented to person, place, and time.   Psychiatric:         Behavior: Behavior normal.           LABS:    U/a performed in office today: yellow, ph 8, 1.000, trace protein, otherwise normal  Bladder scan performed by Nurse Lindsey.   ml    Lab Results   Component Value Date    PSA 0.63 12/31/2020    PSA 0.61 10/03/2018    PSA 0.73 09/12/2016    PSA 0.64 12/12/2012    PSA 0.78 04/07/2011    PSADIAG 0.70 09/10/2015       IMPRESSION:  Encounter Diagnoses   Name Primary?    Enlarged prostate Yes    Incomplete bladder emptying          PLAN:  Problem List Items Addressed This Visit    None     Visit Diagnoses     Enlarged prostate    -  Primary    Relevant Orders    POCT URINE DIPSTICK WITHOUT MICROSCOPE (Completed)    POCT Bladder Scan (Completed)    PROSTATE SPECIFIC ANTIGEN, DIAGNOSTIC    Incomplete bladder emptying              1. Enlarge prostate   - DARIN complete   - obtain PSA  2. Incomplete bladder emptying   - chronic   - has tried flomax in the past, not interested in medication today    3. RTC in 1 yr or sooner prn    I spent 30  minutes with the patient. Over 50% of the visit was spent in counseling.    Letha Posadas NP

## 2022-08-31 ENCOUNTER — OFFICE VISIT (OUTPATIENT)
Dept: PHYSICAL MEDICINE AND REHAB | Facility: CLINIC | Age: 67
End: 2022-08-31
Payer: MEDICARE

## 2022-08-31 ENCOUNTER — HOSPITAL ENCOUNTER (OUTPATIENT)
Dept: RADIOLOGY | Facility: HOSPITAL | Age: 67
Discharge: HOME | End: 2022-08-31
Attending: PHYSICAL MEDICINE & REHABILITATION
Payer: MEDICARE

## 2022-08-31 VITALS
SYSTOLIC BLOOD PRESSURE: 130 MMHG | WEIGHT: 208.75 LBS | HEIGHT: 70 IN | BODY MASS INDEX: 29.89 KG/M2 | HEART RATE: 52 BPM | DIASTOLIC BLOOD PRESSURE: 82 MMHG

## 2022-08-31 DIAGNOSIS — M25.559 HIP PAIN: ICD-10-CM

## 2022-08-31 DIAGNOSIS — M25.559 PAIN IN UNSPECIFIED HIP: Primary | ICD-10-CM

## 2022-08-31 PROCEDURE — 99999 PR PBB SHADOW E&M-EST. PATIENT-LVL III: CPT | Mod: PBBFAC,,, | Performed by: PHYSICAL MEDICINE & REHABILITATION

## 2022-08-31 PROCEDURE — 99213 OFFICE O/P EST LOW 20 MIN: CPT | Mod: PBBFAC,PN | Performed by: PHYSICAL MEDICINE & REHABILITATION

## 2022-08-31 PROCEDURE — 99203 OFFICE O/P NEW LOW 30 MIN: CPT | Mod: S$PBB,,, | Performed by: PHYSICAL MEDICINE & REHABILITATION

## 2022-08-31 PROCEDURE — 73502 XR PELVIS 3 VIEW INC HIP 2 VIEW RIGHT: ICD-10-PCS | Mod: 26,RT,, | Performed by: RADIOLOGY

## 2022-08-31 PROCEDURE — 99203 PR OFFICE/OUTPT VISIT, NEW, LEVL III, 30-44 MIN: ICD-10-PCS | Mod: S$PBB,,, | Performed by: PHYSICAL MEDICINE & REHABILITATION

## 2022-08-31 PROCEDURE — 99999 PR PBB SHADOW E&M-EST. PATIENT-LVL III: ICD-10-PCS | Mod: PBBFAC,,, | Performed by: PHYSICAL MEDICINE & REHABILITATION

## 2022-08-31 PROCEDURE — 73502 X-RAY EXAM HIP UNI 2-3 VIEWS: CPT | Mod: 26,RT,, | Performed by: RADIOLOGY

## 2022-08-31 PROCEDURE — 73502 X-RAY EXAM HIP UNI 2-3 VIEWS: CPT | Mod: TC,FY,RT

## 2022-08-31 NOTE — PROGRESS NOTES
HPI:  Patient is a 66 y.o. year old male w. Right hip pain since march 2020. He was working on roof  and fell through a whole. He has been having pain since. . It is difficult to go from sitting to standing. It is difficult to walk. Laying on it is very painful. He denies any weakness, frequent falls or any recent trauma. He denies any neuro deficits. The pain shoots to his groin and down his thigh      Past Medical History:   Diagnosis Date    BPH (benign prostatic hyperplasia)     Sebaceous cyst     Urinary tract infection      Past Surgical History:   Procedure Laterality Date    CHOLECYSTECTOMY      orthoscopic knee left       VASECTOMY       Family History   Problem Relation Age of Onset    Cancer Father         lymphoma diagnosed at 81 years    Hyperlipidemia Father     Cancer Mother         colon 49 years old    Melanoma Brother     Colon cancer Sister     Heart attack Neg Hx     Heart disease Neg Hx     Heart failure Neg Hx     Hypertension Neg Hx     Stroke Neg Hx     Psoriasis Neg Hx     Lupus Neg Hx     Eczema Neg Hx      Social History     Socioeconomic History    Marital status:    Tobacco Use    Smoking status: Never    Smokeless tobacco: Never   Substance and Sexual Activity    Alcohol use: Yes     Alcohol/week: 6.0 standard drinks     Types: 6 Cans of beer per week    Drug use: No       Review of patient's allergies indicates:  No Known Allergies    Current Outpatient Medications:     ivermectin (SOOLANTRA) 1 % Crea, AAA face qhs and wash off in morning, Disp: 45 g, Rfl: 3    mometasone 0.1% (ELOCON) 0.1 % cream, aaa poison ivy and other contact dermatitis bid only prn, Disp: 45 g, Rfl: 6    sulfacetamide sodium-sulfur (SULFACLEANSE 8-4) 8-4 % Susp, Wash face once daily, Disp: 472 mL, Rfl: 3      Review of Systems:  No nausea, vomiting, fevers, chills , contipation, diarrhea or sweats,no weight change, occ back stiffness, no chest pain, no sob, no change of bowel or bladder habits,no  coordination issues       Physical Exam:      Vitals:    08/31/22 1049   BP: 130/82   Pulse: (!) 52   alert and oriented ×4 follows commands answers all questions appropriately,affect wnl  Manual muscle test 5 out of 5 EXCEPT LEFT EHL sensation to light touch grossly intact  +excruciate tenderness right greater troch and R QL  Antalgic gait  -slR b/l  Poor psis mobility  +standing flexion test  -STEPH  Dec hip ROM in both IR and ER  babinsky down  No clonus  DTR's symmetric 2+  No C/C/E   No muscular atrophy    Assessment:  Right hip pain likely due to labrum tear (s/p trauma) vs. tendinopathy    Plan:  Right hip xray  MRI of hip  Will evaluate his hip under ultrasound next eval. He may be a candidate for percutaneous tenotomy, once labrum injury is ruled out.  Pamphlet issued.

## 2022-09-16 ENCOUNTER — HOSPITAL ENCOUNTER (OUTPATIENT)
Dept: RADIOLOGY | Facility: HOSPITAL | Age: 67
Discharge: HOME | End: 2022-09-16
Attending: PHYSICAL MEDICINE & REHABILITATION
Payer: MEDICARE

## 2022-09-16 DIAGNOSIS — M25.559 PAIN IN UNSPECIFIED HIP: ICD-10-CM

## 2022-09-16 PROCEDURE — 73721 MRI JNT OF LWR EXTRE W/O DYE: CPT | Mod: TC,RT

## 2022-09-16 PROCEDURE — 73721 MRI JNT OF LWR EXTRE W/O DYE: CPT | Mod: 26,RT,, | Performed by: RADIOLOGY

## 2022-09-16 PROCEDURE — 73721 MRI HIP WITHOUT CONTRAST RIGHT: ICD-10-PCS | Mod: 26,RT,, | Performed by: RADIOLOGY

## 2022-09-19 ENCOUNTER — OFFICE VISIT (OUTPATIENT)
Dept: PHYSICAL MEDICINE AND REHAB | Facility: CLINIC | Age: 67
End: 2022-09-19
Payer: MEDICARE

## 2022-09-19 VITALS
HEIGHT: 70 IN | SYSTOLIC BLOOD PRESSURE: 143 MMHG | BODY MASS INDEX: 29.89 KG/M2 | DIASTOLIC BLOOD PRESSURE: 88 MMHG | WEIGHT: 208.75 LBS | HEART RATE: 63 BPM

## 2022-09-19 DIAGNOSIS — G89.29 HIP PAIN, CHRONIC, RIGHT: Primary | ICD-10-CM

## 2022-09-19 DIAGNOSIS — M25.551 HIP PAIN, CHRONIC, RIGHT: Primary | ICD-10-CM

## 2022-09-19 PROCEDURE — 99213 OFFICE O/P EST LOW 20 MIN: CPT | Mod: S$PBB,25,, | Performed by: PHYSICAL MEDICINE & REHABILITATION

## 2022-09-19 PROCEDURE — 20611 DRAIN/INJ JOINT/BURSA W/US: CPT | Mod: PBBFAC,PN | Performed by: PHYSICAL MEDICINE & REHABILITATION

## 2022-09-19 PROCEDURE — 99999 PR PBB SHADOW E&M-EST. PATIENT-LVL III: CPT | Mod: PBBFAC,,, | Performed by: PHYSICAL MEDICINE & REHABILITATION

## 2022-09-19 PROCEDURE — 99999 PR PBB SHADOW E&M-EST. PATIENT-LVL III: ICD-10-PCS | Mod: PBBFAC,,, | Performed by: PHYSICAL MEDICINE & REHABILITATION

## 2022-09-19 PROCEDURE — 20611 PR DRAIN/ASP/INJECT MAJOR JOINT/BURSA W/US GUIDANCE: ICD-10-PCS | Mod: S$PBB,RT,, | Performed by: PHYSICAL MEDICINE & REHABILITATION

## 2022-09-19 PROCEDURE — 99213 PR OFFICE/OUTPT VISIT, EST, LEVL III, 20-29 MIN: ICD-10-PCS | Mod: S$PBB,25,, | Performed by: PHYSICAL MEDICINE & REHABILITATION

## 2022-09-19 PROCEDURE — 99213 OFFICE O/P EST LOW 20 MIN: CPT | Mod: PBBFAC,PN | Performed by: PHYSICAL MEDICINE & REHABILITATION

## 2022-09-19 PROCEDURE — 20611 DRAIN/INJ JOINT/BURSA W/US: CPT | Mod: S$PBB,RT,, | Performed by: PHYSICAL MEDICINE & REHABILITATION

## 2022-09-19 RX ORDER — LIDOCAINE HYDROCHLORIDE 10 MG/ML
1 INJECTION INFILTRATION; PERINEURAL
Status: COMPLETED | OUTPATIENT
Start: 2022-09-19 | End: 2022-09-19

## 2022-09-19 RX ORDER — METHYLPREDNISOLONE ACETATE 40 MG/ML
40 INJECTION, SUSPENSION INTRA-ARTICULAR; INTRALESIONAL; INTRAMUSCULAR; SOFT TISSUE ONCE
Status: COMPLETED | OUTPATIENT
Start: 2022-09-19 | End: 2022-09-19

## 2022-09-19 RX ADMIN — METHYLPREDNISOLONE ACETATE 40 MG: 40 INJECTION, SUSPENSION INTRA-ARTICULAR; INTRALESIONAL; INTRAMUSCULAR; INTRASYNOVIAL; SOFT TISSUE at 08:09

## 2022-09-19 RX ADMIN — LIDOCAINE HYDROCHLORIDE 1 ML: 10 INJECTION, SOLUTION INFILTRATION; PERINEURAL at 08:09

## 2022-09-19 NOTE — PROGRESS NOTES
HPI:  Patient is a 66 y.o. year old male  being followed up for right hip pain,which began after a fall 2 yrs ago. I ordered an MRI of his hip, results are below. His symptoms continue unchanged. He has decreased ROM, groin pain and pain laying on his hip.    Imaging  MRI HIP WITHOUT CONTRAST RIGHT     CLINICAL HISTORY:  Hip pain, chronic, labral tear suspected, xray done;Hip pain, chronic, tendon/ligament abnormality suspected, xray done;  Pain in unspecified hip     TECHNIQUE:  Multiplanar, multisequence MR imaging of the right hip was performed without contrast     COMPARISON:  None     FINDINGS:  There is blunted morphology with internal T2 isointense signal within the substance of the anterior superior labrum, in keeping with generalized labral degeneration.     There is small marginal femoroacetabular osteophyte formation.  No high-grade articular cartilage defects.     No osseous fracture, stress fracture, or contusion.     Small bilateral hip joint effusions are present.     There are low-grade strains of the origins of the hamstring tendons.     Impression:     Mild right hip osteoarthritis.     Small bilateral hip joint effusions.     Low-grade strains of the hamstring tendon origins    XR PELVIS 3 VIEW INC HIP 2 VIEW RIGHT     CLINICAL HISTORY:  Pain in unspecified hip     TECHNIQUE:  AP, inlet, and outlet views of the pelvis and AP and frog-leg views of the right hip were acquired.     COMPARISON:  None     FINDINGS:  No radiographically evident acute, displaced fracture or osseous destructive process involving the bony pelvis or proximal appendicular skeleton.  There is rim osteophyte formation noted about the left and right femoral neck.  The SI joints are unremarkable.  There is degenerative change of the lower lumbar spine in the form of facet arthropathy and degenerative disc space narrowing.  There is degenerative subcortical cyst formation noted within the lateral aspect of the right acetabulum.   There is degenerative change of the symphysis pubis.     Impression:     As above     Labs  EGFR cr lfts  nl  gluc elev 114      Past Medical History:   Diagnosis Date    BPH (benign prostatic hyperplasia)     Sebaceous cyst     Urinary tract infection      Past Surgical History:   Procedure Laterality Date    CHOLECYSTECTOMY      orthoscopic knee left       VASECTOMY       Family History   Problem Relation Age of Onset    Cancer Father         lymphoma diagnosed at 81 years    Hyperlipidemia Father     Cancer Mother         colon 49 years old    Melanoma Brother     Colon cancer Sister     Heart attack Neg Hx     Heart disease Neg Hx     Heart failure Neg Hx     Hypertension Neg Hx     Stroke Neg Hx     Psoriasis Neg Hx     Lupus Neg Hx     Eczema Neg Hx      Social History     Socioeconomic History    Marital status:    Tobacco Use    Smoking status: Never    Smokeless tobacco: Never   Substance and Sexual Activity    Alcohol use: Yes     Alcohol/week: 6.0 standard drinks     Types: 6 Cans of beer per week    Drug use: No       Review of patient's allergies indicates:  No Known Allergies    Current Outpatient Medications:     ivermectin (SOOLANTRA) 1 % Crea, AAA face qhs and wash off in morning, Disp: 45 g, Rfl: 3    mometasone 0.1% (ELOCON) 0.1 % cream, aaa poison ivy and other contact dermatitis bid only prn, Disp: 45 g, Rfl: 6    sulfacetamide sodium-sulfur (SULFACLEANSE 8-4) 8-4 % Susp, Wash face once daily, Disp: 472 mL, Rfl: 3        Review of Systems  No nausea, vomiting, fevers, Chills , contipation, diarrhea or sweats     Physical Exam:      Vitals:    09/19/22 0754   BP: (!) 143/88   Pulse: 63   alert and oriented ×4 follows commands answers all questions appropriately,affect wnl  Manual muscle test 5 out of 5 EXCEPT LEFT EHL sensation to light touch grossly intact  +excruciate tenderness right greater troch and R QL  Antalgic gait  -slR b/l  Poor psis mobility  +standing flexion test  -STEPH  Dec  hip ROM in both IR and ER  babinsky down  No clonus  DTR's symmetric 2+  No C/C/E   No muscular atrophy       Gluteus medius US FINDINGS  Real-time [facility] ultrasound of RIGHT LATERAL HIP  indicated a hypoechoic lesion in  the gluteus medius tendon which may represent diseased tissue. He had excruciate tenderness  to sono palpation.There was bony irregularity and calcium deposition noted at the point of attachment of the right gluteus medius tendon and the greater trochanter indicative of a chronic tendinopathy.There was a  troch bursa effusion noted. The gluteus minimus and jazmin attachments appeared intact.  Color Doppler  did not show neovascularization or hyperemia within the tendon tissue. 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 Will evaluated to see how he responds after today's injection. If still having pain, may consider percutaneous tenotomy    RTC 4 wks            PROCEDURE NOTE:  Risk and benefit of right US guided trocanteric bursa injection given to pt. Spinal needle 22 g utilized. Area was first anesthesized w. A wheal 1ml 1% lidocaine. Injection performed after sterile prep w. CHLOROHEXEDINE, verbal consent explained, no complications. Depomedrol 40mg 1ml and lidocaine 1ml were used, US guidance was used since it has been shown to have greater efficiency w. Accurate needle placement.     Ultrasound interpretation was performed prior to the procedure to identify the target and any adjacent neurovascular structures.  Subsequently, interpretation was performed during real- time needle guidance confirming placement. Post- intervention interpretation was also performed confirming appropriate injectate flow and hemostasis.   Images indicating needle placement have been saved in [employer].

## 2022-11-03 ENCOUNTER — OFFICE VISIT (OUTPATIENT)
Dept: PHYSICAL MEDICINE AND REHAB | Facility: CLINIC | Age: 67
End: 2022-11-03
Payer: MEDICARE

## 2022-11-03 VITALS — HEART RATE: 59 BPM | SYSTOLIC BLOOD PRESSURE: 144 MMHG | DIASTOLIC BLOOD PRESSURE: 89 MMHG

## 2022-11-03 DIAGNOSIS — S73.191D TEAR OF RIGHT ACETABULAR LABRUM, SUBSEQUENT ENCOUNTER: ICD-10-CM

## 2022-11-03 DIAGNOSIS — S73.191A TEAR OF RIGHT ACETABULAR LABRUM, INITIAL ENCOUNTER: Primary | ICD-10-CM

## 2022-11-03 DIAGNOSIS — M67.959 TENDINOPATHY OF HIP: ICD-10-CM

## 2022-11-03 PROCEDURE — 99213 PR OFFICE/OUTPT VISIT, EST, LEVL III, 20-29 MIN: ICD-10-PCS | Mod: S$PBB,,, | Performed by: PHYSICAL MEDICINE & REHABILITATION

## 2022-11-03 PROCEDURE — 99212 OFFICE O/P EST SF 10 MIN: CPT | Mod: PBBFAC,PN | Performed by: PHYSICAL MEDICINE & REHABILITATION

## 2022-11-03 PROCEDURE — 99999 PR PBB SHADOW E&M-EST. PATIENT-LVL II: CPT | Mod: PBBFAC,,, | Performed by: PHYSICAL MEDICINE & REHABILITATION

## 2022-11-03 PROCEDURE — 99999 PR PBB SHADOW E&M-EST. PATIENT-LVL II: ICD-10-PCS | Mod: PBBFAC,,, | Performed by: PHYSICAL MEDICINE & REHABILITATION

## 2022-11-03 PROCEDURE — 99213 OFFICE O/P EST LOW 20 MIN: CPT | Mod: S$PBB,,, | Performed by: PHYSICAL MEDICINE & REHABILITATION

## 2022-11-03 NOTE — PROGRESS NOTES
HPI:  Patient is a 66 y.o. year old male w. Right hip pain. Last eval. I did a cortisone injection of his right troch. Bursa. He states the outer part of his hip improved. However, the groin pain continues. If twists the wrong way it wakes him up at night.      Past Medical History:   Diagnosis Date    BPH (benign prostatic hyperplasia)     Sebaceous cyst     Urinary tract infection      Past Surgical History:   Procedure Laterality Date    CHOLECYSTECTOMY      orthoscopic knee left       VASECTOMY       Family History   Problem Relation Age of Onset    Cancer Father         lymphoma diagnosed at 81 years    Hyperlipidemia Father     Cancer Mother         colon 49 years old    Melanoma Brother     Colon cancer Sister     Heart attack Neg Hx     Heart disease Neg Hx     Heart failure Neg Hx     Hypertension Neg Hx     Stroke Neg Hx     Psoriasis Neg Hx     Lupus Neg Hx     Eczema Neg Hx      Social History     Socioeconomic History    Marital status:    Tobacco Use    Smoking status: Never    Smokeless tobacco: Never   Substance and Sexual Activity    Alcohol use: Yes     Alcohol/week: 6.0 standard drinks     Types: 6 Cans of beer per week    Drug use: No       Review of patient's allergies indicates:  No Known Allergies    Current Outpatient Medications:     ivermectin (SOOLANTRA) 1 % Crea, AAA face qhs and wash off in morning, Disp: 45 g, Rfl: 3    mometasone 0.1% (ELOCON) 0.1 % cream, aaa poison ivy and other contact dermatitis bid only prn, Disp: 45 g, Rfl: 6    sulfacetamide sodium-sulfur (SULFACLEANSE 8-4) 8-4 % Susp, Wash face once daily, Disp: 472 mL, Rfl: 3      Review of Systems  No nausea, vomiting, fevers, Chills , contipation, diarrhea or sweats     Physical Exam:      Vitals:    11/03/22 0941   BP: (!) 144/89   Pulse: (!) 59     alert and oriented ×4 follows commands answers all questions appropriately,affect wnl  Manual muscle test 5 out of 5 EXCEPT LEFT EHL sensation to light touch grossly  intact  Antalgic gait  Dec hip right  ROM in both IR and ER  No C/C/E   No muscular atrophy      Assessment:  Right trochanteric bursitis-resolved  Right gluteus medius tendinopathy  Mild right hip OA  Mild labrum degeneration     Plan:  Discussed treatment options for his hip including  therapy, PRP /prolotherapy, cortisone injections, and percutaneous tenotomy. She is interested in percutaneous tenotomy. She will obtain Xrays of her shoulder and will evaluate her shoulder under ultrasound next encounter to see if she is a good candidate.    He would like to try physical therapy first, prescription issued

## 2022-11-08 ENCOUNTER — PATIENT MESSAGE (OUTPATIENT)
Dept: PHYSICAL MEDICINE AND REHAB | Facility: CLINIC | Age: 67
End: 2022-11-08
Payer: COMMERCIAL

## 2022-11-09 ENCOUNTER — TELEPHONE (OUTPATIENT)
Dept: PHYSICAL MEDICINE AND REHAB | Facility: CLINIC | Age: 67
End: 2022-11-09
Payer: COMMERCIAL

## 2023-02-08 ENCOUNTER — OFFICE VISIT (OUTPATIENT)
Dept: FAMILY MEDICINE | Facility: CLINIC | Age: 68
End: 2023-02-08
Payer: MEDICARE

## 2023-02-08 VITALS
SYSTOLIC BLOOD PRESSURE: 146 MMHG | HEIGHT: 70 IN | HEART RATE: 79 BPM | DIASTOLIC BLOOD PRESSURE: 80 MMHG | TEMPERATURE: 98 F | OXYGEN SATURATION: 99 % | WEIGHT: 216.69 LBS | BODY MASS INDEX: 31.02 KG/M2

## 2023-02-08 DIAGNOSIS — S73.101D HIP SPRAIN, RIGHT, SUBSEQUENT ENCOUNTER: ICD-10-CM

## 2023-02-08 DIAGNOSIS — R03.0 BLOOD PRESSURE ELEVATED WITHOUT HISTORY OF HTN: ICD-10-CM

## 2023-02-08 DIAGNOSIS — E78.00 HYPERCHOLESTEROLEMIA: Primary | ICD-10-CM

## 2023-02-08 PROCEDURE — 99999 PR PBB SHADOW E&M-EST. PATIENT-LVL III: ICD-10-PCS | Mod: PBBFAC,,, | Performed by: PHYSICIAN ASSISTANT

## 2023-02-08 PROCEDURE — 99999 PR PBB SHADOW E&M-EST. PATIENT-LVL III: CPT | Mod: PBBFAC,,, | Performed by: PHYSICIAN ASSISTANT

## 2023-02-08 PROCEDURE — 99213 OFFICE O/P EST LOW 20 MIN: CPT | Mod: PBBFAC,PO | Performed by: PHYSICIAN ASSISTANT

## 2023-02-08 PROCEDURE — 99213 PR OFFICE/OUTPT VISIT, EST, LEVL III, 20-29 MIN: ICD-10-PCS | Mod: S$PBB,,, | Performed by: PHYSICIAN ASSISTANT

## 2023-02-08 PROCEDURE — 99213 OFFICE O/P EST LOW 20 MIN: CPT | Mod: S$PBB,,, | Performed by: PHYSICIAN ASSISTANT

## 2023-02-08 NOTE — PROGRESS NOTES
Subjective:       Patient ID: Marky Patel is a 67 y.o. male.    Chief Complaint: Annual Exam    Patient with hyperlipidemia presents for routine visit  He was in PT last year for chronic right hip pain and he is requesting new orders.  He continues to have pain.  BP noted to be elevated today and past visit.  No history of HTN.  States BP usually normal.  No other complaints.   Patients patient medical/surgical, social and family histories have been reviewed       Review of Systems   Constitutional:  Negative for activity change and unexpected weight change.   HENT:  Negative for hearing loss, rhinorrhea and trouble swallowing.    Eyes:  Negative for discharge and visual disturbance.   Respiratory:  Negative for chest tightness and wheezing.    Cardiovascular:  Negative for chest pain and palpitations.   Gastrointestinal:  Negative for blood in stool, constipation, diarrhea and vomiting.   Endocrine: Positive for polyuria. Negative for polydipsia.   Genitourinary:  Positive for urgency. Negative for difficulty urinating and hematuria.   Musculoskeletal:  Positive for arthralgias. Negative for joint swelling and neck pain.   Neurological:  Negative for weakness and headaches.   Psychiatric/Behavioral:  Negative for confusion and dysphoric mood.      Objective:      Physical Exam  Constitutional:       General: He is not in acute distress.     Appearance: He is well-developed.   HENT:      Head: Normocephalic and atraumatic.   Eyes:      Conjunctiva/sclera: Conjunctivae normal.   Cardiovascular:      Rate and Rhythm: Normal rate and regular rhythm.      Heart sounds: Normal heart sounds.   Pulmonary:      Effort: Pulmonary effort is normal.      Breath sounds: Normal breath sounds.   Musculoskeletal:      Cervical back: Neck supple. No tenderness.      Right lower leg: No edema.      Left lower leg: No edema.   Skin:     General: Skin is warm and dry.   Neurological:      General: No focal deficit present.       "Mental Status: He is alert.   Psychiatric:         Mood and Affect: Mood normal.         Behavior: Behavior normal. Behavior is cooperative.         Judgment: Judgment normal.       Assessment:       1. Hypercholesterolemia    2. Blood pressure elevated without history of HTN    3. Hip sprain, right, subsequent encounter          Plan:       Marky was seen today for annual exam.    Diagnoses and all orders for this visit:    Hypercholesterolemia  -     TSH; Future  -     CBC Auto Differential; Future  -     Lipid Panel; Future  -     Comprehensive Metabolic Panel; Future    Blood pressure elevated without history of HTN    Hip sprain, right, subsequent encounter  -     Ambulatory referral/consult to Physical/Occupational Therapy; Future           Follow up for fasting lab soon & BP nurse same day , estab pcp in 1-3 mo, .           Documentation entered by me for this encounter may have been done in part using speech-recognition technology. Although I have made an effort to ensure accuracy, "sound like" errors may exist and should be interpreted in context.  "

## 2023-02-15 ENCOUNTER — PATIENT MESSAGE (OUTPATIENT)
Dept: RESEARCH | Facility: HOSPITAL | Age: 68
End: 2023-02-15
Payer: COMMERCIAL

## 2023-02-24 ENCOUNTER — PATIENT MESSAGE (OUTPATIENT)
Dept: RESEARCH | Facility: HOSPITAL | Age: 68
End: 2023-02-24
Payer: COMMERCIAL

## 2023-02-24 ENCOUNTER — PATIENT MESSAGE (OUTPATIENT)
Dept: FAMILY MEDICINE | Facility: CLINIC | Age: 68
End: 2023-02-24

## 2023-02-24 ENCOUNTER — LAB VISIT (OUTPATIENT)
Dept: LAB | Facility: HOSPITAL | Age: 68
End: 2023-02-24
Payer: MEDICARE

## 2023-02-24 ENCOUNTER — TELEPHONE (OUTPATIENT)
Dept: FAMILY MEDICINE | Facility: CLINIC | Age: 68
End: 2023-02-24

## 2023-02-24 ENCOUNTER — CLINICAL SUPPORT (OUTPATIENT)
Dept: FAMILY MEDICINE | Facility: CLINIC | Age: 68
End: 2023-02-24
Payer: MEDICARE

## 2023-02-24 VITALS — SYSTOLIC BLOOD PRESSURE: 126 MMHG | HEART RATE: 68 BPM | DIASTOLIC BLOOD PRESSURE: 74 MMHG

## 2023-02-24 DIAGNOSIS — E78.00 HYPERCHOLESTEROLEMIA: ICD-10-CM

## 2023-02-24 DIAGNOSIS — Z01.30 BP CHECK: Primary | ICD-10-CM

## 2023-02-24 LAB
ALBUMIN SERPL BCP-MCNC: 4.2 G/DL (ref 3.5–5.2)
ALP SERPL-CCNC: 69 U/L (ref 55–135)
ALT SERPL W/O P-5'-P-CCNC: 42 U/L (ref 10–44)
ANION GAP SERPL CALC-SCNC: 11 MMOL/L (ref 8–16)
AST SERPL-CCNC: 31 U/L (ref 10–40)
BASOPHILS # BLD AUTO: 0.06 K/UL (ref 0–0.2)
BASOPHILS NFR BLD: 1.1 % (ref 0–1.9)
BILIRUB SERPL-MCNC: 0.4 MG/DL (ref 0.1–1)
BUN SERPL-MCNC: 12 MG/DL (ref 8–23)
CALCIUM SERPL-MCNC: 9.3 MG/DL (ref 8.7–10.5)
CHLORIDE SERPL-SCNC: 105 MMOL/L (ref 95–110)
CHOLEST SERPL-MCNC: 260 MG/DL (ref 120–199)
CHOLEST/HDLC SERPL: 5.7 {RATIO} (ref 2–5)
CO2 SERPL-SCNC: 23 MMOL/L (ref 23–29)
CREAT SERPL-MCNC: 1.1 MG/DL (ref 0.5–1.4)
DIFFERENTIAL METHOD: ABNORMAL
EOSINOPHIL # BLD AUTO: 0.1 K/UL (ref 0–0.5)
EOSINOPHIL NFR BLD: 2.4 % (ref 0–8)
ERYTHROCYTE [DISTWIDTH] IN BLOOD BY AUTOMATED COUNT: 12.4 % (ref 11.5–14.5)
EST. GFR  (NO RACE VARIABLE): >60 ML/MIN/1.73 M^2
GLUCOSE SERPL-MCNC: 108 MG/DL (ref 70–110)
HCT VFR BLD AUTO: 42.5 % (ref 40–54)
HDLC SERPL-MCNC: 46 MG/DL (ref 40–75)
HDLC SERPL: 17.7 % (ref 20–50)
HGB BLD-MCNC: 14.5 G/DL (ref 14–18)
IMM GRANULOCYTES # BLD AUTO: 0.03 K/UL (ref 0–0.04)
IMM GRANULOCYTES NFR BLD AUTO: 0.5 % (ref 0–0.5)
LDLC SERPL CALC-MCNC: 177 MG/DL (ref 63–159)
LYMPHOCYTES # BLD AUTO: 1.9 K/UL (ref 1–4.8)
LYMPHOCYTES NFR BLD: 34.6 % (ref 18–48)
MCH RBC QN AUTO: 31.2 PG (ref 27–31)
MCHC RBC AUTO-ENTMCNC: 34.1 G/DL (ref 32–36)
MCV RBC AUTO: 91 FL (ref 82–98)
MONOCYTES # BLD AUTO: 0.7 K/UL (ref 0.3–1)
MONOCYTES NFR BLD: 12.5 % (ref 4–15)
NEUTROPHILS # BLD AUTO: 2.7 K/UL (ref 1.8–7.7)
NEUTROPHILS NFR BLD: 48.9 % (ref 38–73)
NONHDLC SERPL-MCNC: 214 MG/DL
NRBC BLD-RTO: 0 /100 WBC
PLATELET # BLD AUTO: 236 K/UL (ref 150–450)
PMV BLD AUTO: 9.9 FL (ref 9.2–12.9)
POTASSIUM SERPL-SCNC: 4.3 MMOL/L (ref 3.5–5.1)
PROT SERPL-MCNC: 6.9 G/DL (ref 6–8.4)
RBC # BLD AUTO: 4.65 M/UL (ref 4.6–6.2)
SODIUM SERPL-SCNC: 139 MMOL/L (ref 136–145)
TRIGL SERPL-MCNC: 185 MG/DL (ref 30–150)
TSH SERPL DL<=0.005 MIU/L-ACNC: 1.44 UIU/ML (ref 0.4–4)
WBC # BLD AUTO: 5.52 K/UL (ref 3.9–12.7)

## 2023-02-24 PROCEDURE — 80053 COMPREHEN METABOLIC PANEL: CPT | Performed by: PHYSICIAN ASSISTANT

## 2023-02-24 PROCEDURE — 84443 ASSAY THYROID STIM HORMONE: CPT | Performed by: PHYSICIAN ASSISTANT

## 2023-02-24 PROCEDURE — 99999 PR PBB SHADOW E&M-EST. PATIENT-LVL II: ICD-10-PCS | Mod: PBBFAC,,,

## 2023-02-24 PROCEDURE — 85025 COMPLETE CBC W/AUTO DIFF WBC: CPT | Performed by: PHYSICIAN ASSISTANT

## 2023-02-24 PROCEDURE — 36415 COLL VENOUS BLD VENIPUNCTURE: CPT | Mod: PO | Performed by: PHYSICIAN ASSISTANT

## 2023-02-24 PROCEDURE — 80061 LIPID PANEL: CPT | Performed by: PHYSICIAN ASSISTANT

## 2023-02-24 PROCEDURE — 99999 PR PBB SHADOW E&M-EST. PATIENT-LVL II: CPT | Mod: PBBFAC,,,

## 2023-02-24 PROCEDURE — 99212 OFFICE O/P EST SF 10 MIN: CPT | Mod: PBBFAC,PO

## 2023-02-24 NOTE — TELEPHONE ENCOUNTER
BP looks good   Follow up as scheduled   
Marky Patel 67 y.o. male is here today for Blood Pressure check.   History of HTN no. BP elevated at last ofc visit 2/8 - 46/89.    Review of patient's allergies indicates:  No Known Allergies  Creatinine   Date Value Ref Range Status   12/31/2020 1.0 0.5 - 1.4 mg/dL Final     Sodium   Date Value Ref Range Status   12/31/2020 139 136 - 145 mmol/L Final     Potassium   Date Value Ref Range Status   12/31/2020 4.6 3.5 - 5.1 mmol/L Final     No current outpatient medications on file.    Patient is asymptomatic.     Initial BP today was 126/78  After 5 minutes BP: 126/74 , Pulse: 68 .  4/123 est care w/Dr. Gurpreet Clements PA-C notified.    
6

## 2023-02-24 NOTE — PROGRESS NOTES
Marky Patel 67 y.o. male is here today for Blood Pressure check.   History of HTN no. BP elevated at last ofc visit 2/8 - 46/89.    Review of patient's allergies indicates:  No Known Allergies  Creatinine   Date Value Ref Range Status   12/31/2020 1.0 0.5 - 1.4 mg/dL Final     Sodium   Date Value Ref Range Status   12/31/2020 139 136 - 145 mmol/L Final     Potassium   Date Value Ref Range Status   12/31/2020 4.6 3.5 - 5.1 mmol/L Final     No current outpatient medications on file.    Patient is asymptomatic.     Initial BP today was 126/78  After 5 minutes BP: 126/74 , Pulse: 68 .  4/123 est care w/Dr. Gurpreet Clements PA-C notified.

## 2023-09-05 ENCOUNTER — OFFICE VISIT (OUTPATIENT)
Dept: UROLOGY | Facility: CLINIC | Age: 68
End: 2023-09-05
Payer: MEDICARE

## 2023-09-05 VITALS
BODY MASS INDEX: 29.2 KG/M2 | WEIGHT: 203.94 LBS | SYSTOLIC BLOOD PRESSURE: 133 MMHG | DIASTOLIC BLOOD PRESSURE: 73 MMHG | HEART RATE: 53 BPM | HEIGHT: 70 IN

## 2023-09-05 DIAGNOSIS — N13.8 BENIGN PROSTATIC HYPERPLASIA WITH URINARY OBSTRUCTION: ICD-10-CM

## 2023-09-05 DIAGNOSIS — Z12.5 PROSTATE CANCER SCREENING: ICD-10-CM

## 2023-09-05 DIAGNOSIS — N40.1 BENIGN PROSTATIC HYPERPLASIA WITH URINARY OBSTRUCTION: ICD-10-CM

## 2023-09-05 DIAGNOSIS — N40.0 ENLARGED PROSTATE: Primary | ICD-10-CM

## 2023-09-05 LAB
BILIRUB SERPL-MCNC: NORMAL MG/DL
BLOOD URINE, POC: NORMAL
CLARITY, POC UA: CLEAR
COLOR, POC UA: NORMAL
GLUCOSE UR QL STRIP: NORMAL
KETONES UR QL STRIP: NORMAL
LEUKOCYTE ESTERASE URINE, POC: NORMAL
NITRITE, POC UA: NORMAL
PH, POC UA: 6
POC RESIDUAL URINE VOLUME: 211 ML (ref 0–100)
PROTEIN, POC: NORMAL
SPECIFIC GRAVITY, POC UA: 1
UROBILINOGEN, POC UA: NORMAL

## 2023-09-05 PROCEDURE — 51798 US URINE CAPACITY MEASURE: CPT | Mod: PBBFAC | Performed by: UROLOGY

## 2023-09-05 PROCEDURE — 81002 URINALYSIS NONAUTO W/O SCOPE: CPT | Mod: PBBFAC | Performed by: UROLOGY

## 2023-09-05 PROCEDURE — 99213 OFFICE O/P EST LOW 20 MIN: CPT | Mod: S$PBB,,, | Performed by: UROLOGY

## 2023-09-05 PROCEDURE — 99999PBSHW POCT BLADDER SCAN: Mod: PBBFAC,,,

## 2023-09-05 PROCEDURE — 99213 PR OFFICE/OUTPT VISIT, EST, LEVL III, 20-29 MIN: ICD-10-PCS | Mod: S$PBB,,, | Performed by: UROLOGY

## 2023-09-05 PROCEDURE — 99999PBSHW POCT URINE DIPSTICK WITHOUT MICROSCOPE: Mod: PBBFAC,,,

## 2023-09-05 PROCEDURE — 99999PBSHW POCT URINE DIPSTICK WITHOUT MICROSCOPE: ICD-10-PCS | Mod: PBBFAC,,,

## 2023-09-05 PROCEDURE — 99999 PR PBB SHADOW E&M-EST. PATIENT-LVL III: ICD-10-PCS | Mod: PBBFAC,,, | Performed by: UROLOGY

## 2023-09-05 PROCEDURE — 99999 PR PBB SHADOW E&M-EST. PATIENT-LVL III: CPT | Mod: PBBFAC,,, | Performed by: UROLOGY

## 2023-09-05 PROCEDURE — 99213 OFFICE O/P EST LOW 20 MIN: CPT | Mod: PBBFAC | Performed by: UROLOGY

## 2023-09-05 NOTE — PROGRESS NOTES
"Urology - [facility]  Clinic Note    SUBJECTIVE:     Chief Complaint: BPH    History of Present Illness:  Marky Patel is a 67 y.o. male who presents to clinic for BPH. He is established to our clinic to our clinic. Referral from No ref. provider found     Nocturia 0-2 times. No gross hematuria. Urinary frequency every 2 hours (6 months ago maybe a little longer).   No urgency. No incontinence. No dysuria.   Has intermittency at times, sometimes slow.     Takes vitamins - B, C, ashwagonda, magnesium, (14 supplements) -   Some male supplement 3-4 weeks ago - did some research on it.     Exercising regularly.     No constipation.     H/o incomplete bladder emptying, suds with Dr. Hodgson before.   Tried flomax in past and failed.     Anticoagulation:  No    OBJECTIVE:     Estimated body mass index is 29.26 kg/m² as calculated from the following:    Height as of this encounter: 5' 10" (1.778 m).    Weight as of this encounter: 92.5 kg (203 lb 14.8 oz).    Vital Signs (Most Recent)  Pulse: (!) 53 (09/05/23 0920)  BP: 133/73 (09/05/23 0920)    Physical Exam  Vitals reviewed.   Pulmonary:      Effort: Pulmonary effort is normal.   Genitourinary:     Comments: Prostate was smooth without nodularity. No rectal masses.  >40 grams.     Neurological:      Mental Status: He is alert.         Lab Results   Component Value Date    BUN 12 02/24/2023    CREATININE 1.1 02/24/2023    WBC 5.52 02/24/2023    HGB 14.5 02/24/2023    HCT 42.5 02/24/2023     02/24/2023    AST 31 02/24/2023    ALT 42 02/24/2023    ALKPHOS 69 02/24/2023    ALBUMIN 4.2 02/24/2023        Lab Results   Component Value Date    PSA 0.63 12/31/2020    PSA 0.61 10/03/2018    PSA 0.73 09/12/2016    PSA 0.64 12/12/2012    PSA 0.78 04/07/2011    PSADIAG 0.82 08/15/2022    PSADIAG 0.70 09/10/2015       Imaging:     Prostate 5.4 cm.  Unremarkable urinary bladder.      A post void residual bladder scan was performed immediately after voiding. The " patient's PVR was noted to be 211cc, voided again and repeat was 17cc.     ASSESSMENT     1. Enlarged prostate    2. Benign prostatic hyperplasia with urinary obstruction      PLAN:   Marky was seen today for urinary urgency, urinary frequency and benign prostatic hypertrophy.    Diagnoses and all orders for this visit:    Enlarged prostate  -     POCT URINE DIPSTICK WITHOUT MICROSCOPE  -     POCT Bladder Scan    Benign prostatic hyperplasia with urinary obstruction    Prostate cancer screening  -     PSA, Screening; Future    Double voiding for now. Patient not interested in other intervention.     Letha Ramos MD

## 2024-10-01 ENCOUNTER — OFFICE VISIT (OUTPATIENT)
Dept: UROLOGY | Facility: CLINIC | Age: 69
End: 2024-10-01
Payer: MEDICARE

## 2024-10-01 VITALS
SYSTOLIC BLOOD PRESSURE: 132 MMHG | WEIGHT: 210.63 LBS | HEART RATE: 60 BPM | DIASTOLIC BLOOD PRESSURE: 72 MMHG | BODY MASS INDEX: 30.15 KG/M2 | HEIGHT: 70 IN

## 2024-10-01 DIAGNOSIS — Z12.5 PROSTATE CANCER SCREENING: Primary | ICD-10-CM

## 2024-10-01 DIAGNOSIS — Z00.00 HEALTHCARE MAINTENANCE: ICD-10-CM

## 2024-10-01 DIAGNOSIS — E78.00 HYPERCHOLESTEROLEMIA: ICD-10-CM

## 2024-10-01 DIAGNOSIS — R63.5 WEIGHT GAIN: ICD-10-CM

## 2024-10-01 PROCEDURE — 99213 OFFICE O/P EST LOW 20 MIN: CPT | Mod: PBBFAC | Performed by: UROLOGY

## 2024-10-01 PROCEDURE — 99999 PR PBB SHADOW E&M-EST. PATIENT-LVL III: CPT | Mod: PBBFAC,,, | Performed by: UROLOGY

## 2024-10-01 PROCEDURE — 99213 OFFICE O/P EST LOW 20 MIN: CPT | Mod: S$PBB,,, | Performed by: UROLOGY

## 2024-10-01 RX ORDER — TRAMADOL HYDROCHLORIDE 50 MG/1
50 TABLET ORAL EVERY 8 HOURS PRN
COMMUNITY
Start: 2024-09-20

## 2024-10-01 RX ORDER — GABAPENTIN 600 MG/1
1 TABLET ORAL NIGHTLY
COMMUNITY
Start: 2024-09-16 | End: 2025-09-16

## 2024-10-01 NOTE — PROGRESS NOTES
"Urology - [facility]  Clinic Note    SUBJECTIVE:     Chief Complaint: BPH    History of Present Illness:  Marky Patel is a 68 y.o. male who presents to clinic for BPH. He is established to our clinic to our clinic. Referral from No ref. provider found     Nocturia 0-2 times. No gross hematuria. Urinary frequency every 2 hours (6 months ago maybe a little longer).   No urgency. No incontinence. No dysuria.   Has intermittency at times, sometimes slow.     Takes vitamins - B, C, ashwagonda, magnesium, (14 supplements) -   Some male supplement 3-4 weeks ago - did some research on it.     Exercising regularly.     No constipation.     H/o incomplete bladder emptying, suds with Dr. Hodgson before.   Tried flomax in past and failed.     Anticoagulation:  No    OBJECTIVE:     Estimated body mass index is 30.22 kg/m² as calculated from the following:    Height as of this encounter: 5' 10" (1.778 m).    Weight as of this encounter: 95.5 kg (210 lb 9.6 oz).    Vital Signs (Most Recent)  Pulse: 60 (10/01/24 0934)  BP: 132/72 (10/01/24 0934)    Physical Exam  Vitals reviewed.   Pulmonary:      Effort: Pulmonary effort is normal.   Genitourinary:     Comments:     Neurological:      Mental Status: He is alert.       Lab Results   Component Value Date    BUN 12 02/24/2023    CREATININE 1.1 02/24/2023    WBC 5.52 02/24/2023    HGB 14.5 02/24/2023    HCT 42.5 02/24/2023     02/24/2023    AST 31 02/24/2023    ALT 42 02/24/2023    ALKPHOS 69 02/24/2023    ALBUMIN 4.2 02/24/2023        Lab Results   Component Value Date    PSA 0.63 12/31/2020    PSA 0.61 10/03/2018    PSA 0.73 09/12/2016    PSA 0.64 12/12/2012    PSA 0.78 04/07/2011    PSADIAG 0.82 08/15/2022    PSADIAG 0.70 09/10/2015       Imaging:     Prostate 5.4 cm.  Unremarkable urinary bladder.        ASSESSMENT     1. Prostate cancer screening    2. Hypercholesterolemia    3. Healthcare maintenance    4. Weight gain        PLAN:   Marky Zazueta" was seen " today for follow-up and benign prostatic hypertrophy.    Diagnoses and all orders for this visit:    Prostate cancer screening  -     PSA, Screening; Future  -     PSA, Screening; Future    Hypercholesterolemia  -     LIPID PANEL; Future  -     CBC Without Differential; Future    Healthcare maintenance  -     CBC Without Differential; Future  -     Comprehensive Metabolic Panel; Future    Weight gain  -     TSH; Future  -     Testosterone; Future      Double voiding for now. Patient not interested in other intervention.   Needs a pcp    Letha Ramos MD

## 2024-10-03 ENCOUNTER — LAB VISIT (OUTPATIENT)
Dept: LAB | Facility: HOSPITAL | Age: 69
End: 2024-10-03
Attending: UROLOGY
Payer: MEDICARE

## 2024-10-03 DIAGNOSIS — Z00.00 HEALTHCARE MAINTENANCE: ICD-10-CM

## 2024-10-03 DIAGNOSIS — E78.00 HYPERCHOLESTEROLEMIA: ICD-10-CM

## 2024-10-03 DIAGNOSIS — R63.5 WEIGHT GAIN: ICD-10-CM

## 2024-10-03 LAB
ALBUMIN SERPL BCP-MCNC: 4.1 G/DL (ref 3.5–5.2)
ALP SERPL-CCNC: 57 U/L (ref 55–135)
ALT SERPL W/O P-5'-P-CCNC: 29 U/L (ref 10–44)
ANION GAP SERPL CALC-SCNC: 7 MMOL/L (ref 8–16)
AST SERPL-CCNC: 26 U/L (ref 10–40)
BILIRUB SERPL-MCNC: 0.7 MG/DL (ref 0.1–1)
BUN SERPL-MCNC: 15 MG/DL (ref 8–23)
CALCIUM SERPL-MCNC: 8.8 MG/DL (ref 8.7–10.5)
CHLORIDE SERPL-SCNC: 106 MMOL/L (ref 95–110)
CHOLEST SERPL-MCNC: 200 MG/DL (ref 120–199)
CHOLEST/HDLC SERPL: 4.5 {RATIO} (ref 2–5)
CO2 SERPL-SCNC: 25 MMOL/L (ref 23–29)
CREAT SERPL-MCNC: 0.9 MG/DL (ref 0.5–1.4)
ERYTHROCYTE [DISTWIDTH] IN BLOOD BY AUTOMATED COUNT: 12.7 % (ref 11.5–14.5)
EST. GFR  (NO RACE VARIABLE): >60 ML/MIN/1.73 M^2
GLUCOSE SERPL-MCNC: 109 MG/DL (ref 70–110)
HCT VFR BLD AUTO: 41.2 % (ref 40–54)
HDLC SERPL-MCNC: 44 MG/DL (ref 40–75)
HDLC SERPL: 22 % (ref 20–50)
HGB BLD-MCNC: 13.9 G/DL (ref 14–18)
LDLC SERPL CALC-MCNC: 135.2 MG/DL (ref 63–159)
MCH RBC QN AUTO: 30.7 PG (ref 27–31)
MCHC RBC AUTO-ENTMCNC: 33.7 G/DL (ref 32–36)
MCV RBC AUTO: 91 FL (ref 82–98)
NONHDLC SERPL-MCNC: 156 MG/DL
PLATELET # BLD AUTO: 212 K/UL (ref 150–450)
PMV BLD AUTO: 10.4 FL (ref 9.2–12.9)
POTASSIUM SERPL-SCNC: 4 MMOL/L (ref 3.5–5.1)
PROT SERPL-MCNC: 6.7 G/DL (ref 6–8.4)
RBC # BLD AUTO: 4.53 M/UL (ref 4.6–6.2)
SODIUM SERPL-SCNC: 138 MMOL/L (ref 136–145)
TESTOST SERPL-MCNC: 255 NG/DL (ref 304–1227)
TRIGL SERPL-MCNC: 104 MG/DL (ref 30–150)
TSH SERPL DL<=0.005 MIU/L-ACNC: 2.29 UIU/ML (ref 0.4–4)
WBC # BLD AUTO: 5.17 K/UL (ref 3.9–12.7)

## 2024-10-03 PROCEDURE — 36415 COLL VENOUS BLD VENIPUNCTURE: CPT | Mod: PO | Performed by: UROLOGY

## 2024-10-03 PROCEDURE — 80061 LIPID PANEL: CPT | Performed by: UROLOGY

## 2024-10-03 PROCEDURE — 84443 ASSAY THYROID STIM HORMONE: CPT | Performed by: UROLOGY

## 2024-10-03 PROCEDURE — 80053 COMPREHEN METABOLIC PANEL: CPT | Performed by: UROLOGY

## 2024-10-03 PROCEDURE — 85027 COMPLETE CBC AUTOMATED: CPT | Performed by: UROLOGY

## 2024-10-03 PROCEDURE — 84403 ASSAY OF TOTAL TESTOSTERONE: CPT | Performed by: UROLOGY

## 2024-10-07 DIAGNOSIS — R79.89 LOW TESTOSTERONE: Primary | ICD-10-CM

## 2024-10-09 ENCOUNTER — PATIENT MESSAGE (OUTPATIENT)
Dept: UROLOGY | Facility: CLINIC | Age: 69
End: 2024-10-09
Payer: COMMERCIAL

## 2024-10-09 ENCOUNTER — TELEPHONE (OUTPATIENT)
Dept: UROLOGY | Facility: CLINIC | Age: 69
End: 2024-10-09
Payer: COMMERCIAL

## 2024-10-09 NOTE — TELEPHONE ENCOUNTER
Message left on the patient's VM. A written message was left on the patient's mychart.    ADRIANO Anders    ----- Message from Letha Ramos MD sent at 10/7/2024 12:01 PM CDT -----  Have patient get a testosterone repeat and lh. Morning lab.   Testosterone was low.

## 2024-10-15 ENCOUNTER — LAB VISIT (OUTPATIENT)
Dept: LAB | Facility: HOSPITAL | Age: 69
End: 2024-10-15
Attending: UROLOGY
Payer: MEDICARE

## 2024-10-15 DIAGNOSIS — R79.89 LOW TESTOSTERONE: ICD-10-CM

## 2024-10-15 DIAGNOSIS — Z12.5 PROSTATE CANCER SCREENING: ICD-10-CM

## 2024-10-15 LAB
COMPLEXED PSA SERPL-MCNC: 0.65 NG/ML (ref 0–4)
LH SERPL-ACNC: 1.8 MIU/ML (ref 0.6–12.1)
TESTOST SERPL-MCNC: 493 NG/DL (ref 304–1227)

## 2024-10-15 PROCEDURE — 36415 COLL VENOUS BLD VENIPUNCTURE: CPT | Mod: PO | Performed by: UROLOGY

## 2024-10-15 PROCEDURE — 84403 ASSAY OF TOTAL TESTOSTERONE: CPT | Performed by: UROLOGY

## 2024-10-15 PROCEDURE — 84153 ASSAY OF PSA TOTAL: CPT | Performed by: UROLOGY

## 2024-10-15 PROCEDURE — 83002 ASSAY OF GONADOTROPIN (LH): CPT | Performed by: UROLOGY
